# Patient Record
Sex: MALE | Race: OTHER | HISPANIC OR LATINO
[De-identification: names, ages, dates, MRNs, and addresses within clinical notes are randomized per-mention and may not be internally consistent; named-entity substitution may affect disease eponyms.]

---

## 2019-08-19 PROBLEM — Z00.00 ENCOUNTER FOR PREVENTIVE HEALTH EXAMINATION: Status: ACTIVE | Noted: 2019-08-19

## 2019-08-29 ENCOUNTER — APPOINTMENT (OUTPATIENT)
Dept: HEART AND VASCULAR | Facility: CLINIC | Age: 79
End: 2019-08-29
Payer: MEDICARE

## 2019-08-29 VITALS
SYSTOLIC BLOOD PRESSURE: 122 MMHG | BODY MASS INDEX: 28.16 KG/M2 | HEIGHT: 62 IN | RESPIRATION RATE: 12 BRPM | OXYGEN SATURATION: 98 % | DIASTOLIC BLOOD PRESSURE: 80 MMHG | WEIGHT: 153 LBS | HEART RATE: 76 BPM

## 2019-08-29 DIAGNOSIS — Z86.39 PERSONAL HISTORY OF OTHER ENDOCRINE, NUTRITIONAL AND METABOLIC DISEASE: ICD-10-CM

## 2019-08-29 DIAGNOSIS — Z86.2 PERSONAL HISTORY OF DISEASES OF THE BLOOD AND BLOOD-FORMING ORGANS AND CERTAIN DISORDERS INVOLVING THE IMMUNE MECHANISM: ICD-10-CM

## 2019-08-29 DIAGNOSIS — R06.02 SHORTNESS OF BREATH: ICD-10-CM

## 2019-08-29 DIAGNOSIS — Z87.19 PERSONAL HISTORY OF OTHER DISEASES OF THE DIGESTIVE SYSTEM: ICD-10-CM

## 2019-08-29 DIAGNOSIS — Z87.09 PERSONAL HISTORY OF OTHER DISEASES OF THE RESPIRATORY SYSTEM: ICD-10-CM

## 2019-08-29 DIAGNOSIS — Z87.891 PERSONAL HISTORY OF NICOTINE DEPENDENCE: ICD-10-CM

## 2019-08-29 DIAGNOSIS — Z82.49 FAMILY HISTORY OF ISCHEMIC HEART DISEASE AND OTHER DISEASES OF THE CIRCULATORY SYSTEM: ICD-10-CM

## 2019-08-29 PROCEDURE — 99204 OFFICE O/P NEW MOD 45 MIN: CPT

## 2019-08-29 PROCEDURE — 93306 TTE W/DOPPLER COMPLETE: CPT

## 2019-08-29 PROCEDURE — 93000 ELECTROCARDIOGRAM COMPLETE: CPT

## 2019-08-29 PROCEDURE — 93880 EXTRACRANIAL BILAT STUDY: CPT

## 2019-08-29 RX ORDER — FERROUS SULFATE TAB EC 325 MG (65 MG FE EQUIVALENT) 325 (65 FE) MG
325 (65 FE) TABLET DELAYED RESPONSE ORAL
Refills: 0 | Status: ACTIVE | COMMUNITY

## 2019-08-29 RX ORDER — FOLIC ACID 1 MG/1
1 TABLET ORAL
Refills: 0 | Status: ACTIVE | COMMUNITY

## 2019-08-29 RX ORDER — OMEPRAZOLE 20 MG/1
20 CAPSULE, DELAYED RELEASE ORAL
Refills: 0 | Status: ACTIVE | COMMUNITY

## 2019-08-29 RX ORDER — ACETAMINOPHEN/DIPHENHYDRAMINE 500MG-25MG
1000 TABLET ORAL
Refills: 0 | Status: ACTIVE | COMMUNITY

## 2019-08-29 RX ORDER — POTASSIUM CHLORIDE 750 MG/1
10 CAPSULE, EXTENDED RELEASE ORAL
Refills: 0 | Status: ACTIVE | COMMUNITY

## 2019-08-29 RX ORDER — APIXABAN 5 MG/1
5 TABLET, FILM COATED ORAL
Qty: 180 | Refills: 1 | Status: ACTIVE | COMMUNITY

## 2019-08-29 RX ORDER — METOPROLOL SUCCINATE 25 MG/1
25 TABLET, EXTENDED RELEASE ORAL
Refills: 0 | Status: ACTIVE | COMMUNITY

## 2019-08-29 RX ORDER — GEMFIBROZIL 600 MG/1
600 TABLET, FILM COATED ORAL TWICE DAILY
Qty: 60 | Refills: 3 | Status: ACTIVE | COMMUNITY

## 2019-08-29 NOTE — DISCUSSION/SUMMARY
[With Me] : with me [___ Month(s)] : [unfilled] month(s) [FreeTextEntry1] : 1. Shortness of breath: Multifactorial likely secondary to mild COPD and interstitial lung disease. Will obtain echocardiogram and an attempt to obtain the nuclear stress test report. Will refer to pulmonary medicine for further workup.\par 2. Hyperlipidemia: Continue gemfibrozil, will followup lipid panel and advise once results no\par 3. Hypertension: Continue amlodipine 10 mg daily maintain a low-salt diet\par 4. Atrial fibrillation: Continue eliquis 5 mg twice daily metoprolol 25 mg daily\par 5. COPD: Pulmonary evaluation\par 6. Carotid bruit: Carotid duplex\par 7. Vitamin D deficiency: Continue vitamin D supplementation\par A. GERD: Continue PPI GI evaluation

## 2019-08-29 NOTE — PHYSICAL EXAM
[Normal Appearance] : normal appearance [General Appearance - Well Developed] : well developed [Well Groomed] : well groomed [No Deformities] : no deformities [General Appearance - Well Nourished] : well nourished [General Appearance - In No Acute Distress] : no acute distress [No Oral Pallor] : no oral pallor [Normal Oral Mucosa] : normal oral mucosa [Normal Jugular Venous A Waves Present] : normal jugular venous A waves present [No Oral Cyanosis] : no oral cyanosis [Normal Jugular Venous V Waves Present] : normal jugular venous V waves present [No Jugular Venous Garza A Waves] : no jugular venous garza A waves [5th Left ICS - MCL] : palpated at the 5th LICS in the midclavicular line [Normal Rate] : normal [Normal] : normal [Irregularly Irregular] : irregularly irregular [Normal S1] : normal S1 [III] : a grade 3 [Normal S2] : normal S2 [Left Carotid Bruit] : left carotid bruit heard [No Pitting Edema] : no pitting edema present [Right Carotid Bruit] : right carotid bruit heard [Exaggerated Use Of Accessory Muscles For Inspiration] : no accessory muscle use [Respiration, Rhythm And Depth] : normal respiratory rhythm and effort [Auscultation Breath Sounds / Voice Sounds] : lungs were clear to auscultation bilaterally [Abdomen Soft] : soft [Abdomen Mass (___ Cm)] : no abdominal mass palpated [Abdomen Tenderness] : non-tender [Abnormal Walk] : normal gait [Cyanosis, Localized] : no localized cyanosis [Nail Clubbing] : no clubbing of the fingernails [Gait - Sufficient For Exercise Testing] : the gait was sufficient for exercise testing [] : no ischemic changes [Petechial Hemorrhages (___cm)] : no petechial hemorrhages [Affect] : the affect was normal [Oriented To Time, Place, And Person] : oriented to person, place, and time [Mood] : the mood was normal [No Anxiety] : not feeling anxious

## 2019-08-29 NOTE — HISTORY OF PRESENT ILLNESS
[FreeTextEntry1] : 78-year-old male with a past medical history of hypertension hyperlipidemia iron deficiency vitamin D deficiency atrial fibrillation on eliquis comes in for evaluation of shortness of breath. Patient reports a few months of exertional shortness of breath most notably after 4-6 blocks of walking which is been progressively worsening. Denies any chest pain. Patient's had a nuclear stress test done one year ago reportedly within normal limits report not available for review at this time. Patient recently underwent a CT scan with contrast to rule out pulmonary embolism will do note and found to have mild centrilobular emphysematous changes in the upper lobe as well as interstitial lung disease. Currently chest pain-free.

## 2019-08-30 LAB
ALBUMIN SERPL ELPH-MCNC: 5 G/DL
ALP BLD-CCNC: 129 U/L
ALT SERPL-CCNC: 21 U/L
ANION GAP SERPL CALC-SCNC: 14 MMOL/L
AST SERPL-CCNC: 25 U/L
BASOPHILS # BLD AUTO: 0.04 K/UL
BASOPHILS NFR BLD AUTO: 0.5 %
BILIRUB SERPL-MCNC: 0.5 MG/DL
BUN SERPL-MCNC: 20 MG/DL
CALCIUM SERPL-MCNC: 10.1 MG/DL
CHLORIDE SERPL-SCNC: 105 MMOL/L
CHOLEST SERPL-MCNC: 172 MG/DL
CHOLEST/HDLC SERPL: 4.2 RATIO
CO2 SERPL-SCNC: 23 MMOL/L
CREAT SERPL-MCNC: 1.23 MG/DL
EOSINOPHIL # BLD AUTO: 0.18 K/UL
EOSINOPHIL NFR BLD AUTO: 2.3 %
ESTIMATED AVERAGE GLUCOSE: 100 MG/DL
FOLATE SERPL-MCNC: >20 NG/ML
GLUCOSE SERPL-MCNC: 98 MG/DL
HBA1C MFR BLD HPLC: 5.1 %
HCT VFR BLD CALC: 47.9 %
HDLC SERPL-MCNC: 41 MG/DL
HGB BLD-MCNC: 15.5 G/DL
IMM GRANULOCYTES NFR BLD AUTO: 0.6 %
LDLC SERPL CALC-MCNC: 106 MG/DL
LYMPHOCYTES # BLD AUTO: 1.49 K/UL
LYMPHOCYTES NFR BLD AUTO: 19.2 %
MAGNESIUM SERPL-MCNC: 2.1 MG/DL
MAN DIFF?: NORMAL
MCHC RBC-ENTMCNC: 32.4 GM/DL
MCHC RBC-ENTMCNC: 33.3 PG
MCV RBC AUTO: 102.8 FL
MONOCYTES # BLD AUTO: 0.56 K/UL
MONOCYTES NFR BLD AUTO: 7.2 %
NEUTROPHILS # BLD AUTO: 5.46 K/UL
NEUTROPHILS NFR BLD AUTO: 70.2 %
NT-PROBNP SERPL-MCNC: 246 PG/ML
PHOSPHATE SERPL-MCNC: 3.5 MG/DL
PLATELET # BLD AUTO: 342 K/UL
POTASSIUM SERPL-SCNC: 5.3 MMOL/L
PROT SERPL-MCNC: 7.3 G/DL
PSA SERPL-MCNC: 1.83 NG/ML
RBC # BLD: 4.66 M/UL
RBC # FLD: 13.2 %
SODIUM SERPL-SCNC: 142 MMOL/L
T3FREE SERPL-MCNC: 2.73 PG/ML
T4 FREE SERPL-MCNC: 1.1 NG/DL
T4 SERPL-MCNC: 5.6 UG/DL
TRIGL SERPL-MCNC: 123 MG/DL
TSH SERPL-ACNC: 1.48 UIU/ML
VIT B12 SERPL-MCNC: 619 PG/ML
WBC # FLD AUTO: 7.78 K/UL

## 2019-09-03 LAB — 25(OH)D3 SERPL-MCNC: 27.9 NG/ML

## 2020-01-21 ENCOUNTER — APPOINTMENT (OUTPATIENT)
Dept: HEART AND VASCULAR | Facility: CLINIC | Age: 80
End: 2020-01-21
Payer: MEDICARE

## 2020-01-21 VITALS
HEIGHT: 62 IN | WEIGHT: 152 LBS | DIASTOLIC BLOOD PRESSURE: 80 MMHG | RESPIRATION RATE: 12 BRPM | HEART RATE: 70 BPM | SYSTOLIC BLOOD PRESSURE: 120 MMHG | BODY MASS INDEX: 27.97 KG/M2

## 2020-01-21 PROCEDURE — 93000 ELECTROCARDIOGRAM COMPLETE: CPT

## 2020-01-21 PROCEDURE — 99214 OFFICE O/P EST MOD 30 MIN: CPT

## 2020-01-21 NOTE — HISTORY OF PRESENT ILLNESS
[Preoperative Visit] : for a medical evaluation prior to surgery [Scheduled Procedure ___] : a [unfilled] [Metabolic Capacity ___Mets%] : The patient has a metabolic capacity of [unfilled] Mets%  [Good] : Good [Chest Pain] : no chest pain [Dyspnea] : no dyspnea [Lower Extremity Swelling] : no lower extremity swelling [FreeTextEntry1] : 79-year-old male with a past medical history of COPD hypertension hyperlipidemia atrial fibrillation on eliquis comes in for preop clearance for dental work. [facility] seen in [facility] in [facility] for chest pain. Workup was negative determined to be GERD and patient started on PPI. Patient denies any exertional symptoms of chest pain or shortness of breath PND or orthopnea.

## 2020-01-21 NOTE — DISCUSSION/SUMMARY
[Patient Intermediate Risk] : the patient is an intermediate risk [Procedure Low Risk] : the procedure risk is low [Optimized for Surgery] : the patient is optimized for surgery [FreeTextEntry1] : 1. Preop clearance for dental work: Patient is low-intermediate risk for low risk procedure may proceed as planned advised to hold eliquis for 3 doses prior to planned procedure. Instructions given to daughter via telephone as patient and his spouse primarily speak Chinese however due understanding which fairly well.\par 2. COPD: Follow up pulmonary\par 3. Hypertension: Continue amlodipine and Toprol maintain a low-salt diet\par 4. Atrial fibrillation: Restart eliquis as soon as cleared by dentistry.\par 5. Hyperlipidemia: Continue gemfibrozil

## 2020-01-21 NOTE — PHYSICAL EXAM
[General Appearance - Well Developed] : well developed [Normal Appearance] : normal appearance [Well Groomed] : well groomed [General Appearance - Well Nourished] : well nourished [No Deformities] : no deformities [General Appearance - In No Acute Distress] : no acute distress [Normal Oral Mucosa] : normal oral mucosa [No Oral Pallor] : no oral pallor [Normal Jugular Venous A Waves Present] : normal jugular venous A waves present [No Oral Cyanosis] : no oral cyanosis [Normal Jugular Venous V Waves Present] : normal jugular venous V waves present [No Jugular Venous Garza A Waves] : no jugular venous garza A waves [Respiration, Rhythm And Depth] : normal respiratory rhythm and effort [Exaggerated Use Of Accessory Muscles For Inspiration] : no accessory muscle use [Auscultation Breath Sounds / Voice Sounds] : lungs were clear to auscultation bilaterally [Heart Rate And Rhythm] : heart rate and rhythm were normal [Arterial Pulses Normal] : the arterial pulses were normal [Edema] : no peripheral edema present [Abdomen Tenderness] : non-tender [Abdomen Soft] : soft [Abdomen Mass (___ Cm)] : no abdominal mass palpated [Abnormal Walk] : normal gait [Gait - Sufficient For Exercise Testing] : the gait was sufficient for exercise testing [Cyanosis, Localized] : no localized cyanosis [Nail Clubbing] : no clubbing of the fingernails [Petechial Hemorrhages (___cm)] : no petechial hemorrhages [] : no ischemic changes [Oriented To Time, Place, And Person] : oriented to person, place, and time [Mood] : the mood was normal [Affect] : the affect was normal [No Anxiety] : not feeling anxious [FreeTextEntry1] : irreg irreg

## 2020-03-05 ENCOUNTER — NON-APPOINTMENT (OUTPATIENT)
Age: 80
End: 2020-03-05

## 2020-03-05 ENCOUNTER — APPOINTMENT (OUTPATIENT)
Dept: HEART AND VASCULAR | Facility: CLINIC | Age: 80
End: 2020-03-05
Payer: MEDICARE

## 2020-03-05 VITALS
HEIGHT: 62 IN | HEART RATE: 76 BPM | SYSTOLIC BLOOD PRESSURE: 102 MMHG | DIASTOLIC BLOOD PRESSURE: 78 MMHG | WEIGHT: 152 LBS | RESPIRATION RATE: 12 BRPM | BODY MASS INDEX: 27.97 KG/M2

## 2020-03-05 PROCEDURE — 99214 OFFICE O/P EST MOD 30 MIN: CPT

## 2020-03-05 PROCEDURE — 93000 ELECTROCARDIOGRAM COMPLETE: CPT

## 2020-03-05 NOTE — PHYSICAL EXAM
[General Appearance - Well Developed] : well developed [Normal Appearance] : normal appearance [Well Groomed] : well groomed [General Appearance - Well Nourished] : well nourished [No Deformities] : no deformities [General Appearance - In No Acute Distress] : no acute distress [Normal Oral Mucosa] : normal oral mucosa [No Oral Pallor] : no oral pallor [No Oral Cyanosis] : no oral cyanosis [Normal Jugular Venous A Waves Present] : normal jugular venous A waves present [Normal Jugular Venous V Waves Present] : normal jugular venous V waves present [No Jugular Venous Garza A Waves] : no jugular venous garza A waves [Respiration, Rhythm And Depth] : normal respiratory rhythm and effort [Exaggerated Use Of Accessory Muscles For Inspiration] : no accessory muscle use [Auscultation Breath Sounds / Voice Sounds] : lungs were clear to auscultation bilaterally [Heart Rate And Rhythm] : heart rate and rhythm were normal [Arterial Pulses Normal] : the arterial pulses were normal [Edema] : no peripheral edema present [FreeTextEntry1] : irreg irreg [Abdomen Soft] : soft [Abdomen Tenderness] : non-tender [Abdomen Mass (___ Cm)] : no abdominal mass palpated [Abnormal Walk] : normal gait [Gait - Sufficient For Exercise Testing] : the gait was sufficient for exercise testing [Nail Clubbing] : no clubbing of the fingernails [Cyanosis, Localized] : no localized cyanosis [Petechial Hemorrhages (___cm)] : no petechial hemorrhages [] : no ischemic changes [Oriented To Time, Place, And Person] : oriented to person, place, and time [Affect] : the affect was normal [Mood] : the mood was normal [No Anxiety] : not feeling anxious

## 2020-03-05 NOTE — HISTORY OF PRESENT ILLNESS
[FreeTextEntry1] : 79-year-old male with a past medical history hypertension hyperlipidemia COPD comes in for evaluation of chest pain intermittent claudication. Patient reports one week ago while at the dentist chair being fitted for dentures suddenly developed left-sided nonradiating sharp chest discomfort. This was referred to the emergency room ruled out for an MI and discharged. Patient also underwent an ischemic evaluation less than one year ago reportedly within normal limits. Patient denies any exertional chest pain or shortness of breath. However, he does complain of bilateral calf pain when walking.

## 2020-03-05 NOTE — DISCUSSION/SUMMARY
[FreeTextEntry1] : 1. Chest pain: Atypical; advised to get copy of recent ischemic evaluation for review. In the absence of ongoing exertional symptoms, will obtain an EKG. If symptoms recur unless to call or return for further workup.\par 2. Intermittent claudication: PVR will advise once results known.\par 3. A. fib: Continue eliquis and Toprol.\par 4. Hyperlipidemia: Continue gemfibrozil\par 5. Hypertension: Continue amlodipine\par 6. COPD: Continue to followup with PCP and pulmonary

## 2020-03-10 ENCOUNTER — APPOINTMENT (OUTPATIENT)
Dept: HEART AND VASCULAR | Facility: CLINIC | Age: 80
End: 2020-03-10

## 2020-03-10 ENCOUNTER — APPOINTMENT (OUTPATIENT)
Dept: HEART AND VASCULAR | Facility: CLINIC | Age: 80
End: 2020-03-10
Payer: MEDICARE

## 2020-03-10 PROCEDURE — 93923 UPR/LXTR ART STDY 3+ LVLS: CPT

## 2020-06-18 ENCOUNTER — NON-APPOINTMENT (OUTPATIENT)
Age: 80
End: 2020-06-18

## 2020-06-18 ENCOUNTER — APPOINTMENT (OUTPATIENT)
Dept: HEART AND VASCULAR | Facility: CLINIC | Age: 80
End: 2020-06-18
Payer: MEDICARE

## 2020-06-18 VITALS
HEIGHT: 62 IN | DIASTOLIC BLOOD PRESSURE: 80 MMHG | BODY MASS INDEX: 28.34 KG/M2 | HEART RATE: 70 BPM | WEIGHT: 154 LBS | RESPIRATION RATE: 12 BRPM | SYSTOLIC BLOOD PRESSURE: 122 MMHG

## 2020-06-18 PROCEDURE — 93000 ELECTROCARDIOGRAM COMPLETE: CPT

## 2020-06-18 PROCEDURE — 99214 OFFICE O/P EST MOD 30 MIN: CPT

## 2020-06-18 NOTE — DISCUSSION/SUMMARY
[FreeTextEntry1] : 1. Hypertension: Continue metoprolol and amlodipine. Maintain a low-salt diet\par 2. Atrial fibrillation: Continue eliquis\par 3. Hyperlipidemia: Continue gemfibrozil\par 4. COPD: Continue bronchodilators and follow up with PCP in pulmonary.

## 2020-06-18 NOTE — HISTORY OF PRESENT ILLNESS
[FreeTextEntry1] : 79-year-old male with past medical history of hypertension hyperlipidemia COPD comes in for routine followup. Patient denies any chest pain shortness of breath PND or orthopnea. Patient reports intermittent claudication has resolved.

## 2020-06-18 NOTE — PHYSICAL EXAM
[Normal Appearance] : normal appearance [General Appearance - Well Developed] : well developed [General Appearance - Well Nourished] : well nourished [Well Groomed] : well groomed [No Deformities] : no deformities [General Appearance - In No Acute Distress] : no acute distress [Normal Oral Mucosa] : normal oral mucosa [No Oral Pallor] : no oral pallor [No Oral Cyanosis] : no oral cyanosis [Normal Jugular Venous V Waves Present] : normal jugular venous V waves present [Normal Jugular Venous A Waves Present] : normal jugular venous A waves present [No Jugular Venous Garza A Waves] : no jugular venous garza A waves [Respiration, Rhythm And Depth] : normal respiratory rhythm and effort [Auscultation Breath Sounds / Voice Sounds] : lungs were clear to auscultation bilaterally [Exaggerated Use Of Accessory Muscles For Inspiration] : no accessory muscle use [Heart Rate And Rhythm] : heart rate and rhythm were normal [Arterial Pulses Normal] : the arterial pulses were normal [Edema] : no peripheral edema present [Abdomen Soft] : soft [FreeTextEntry1] : irreg irreg [Abdomen Mass (___ Cm)] : no abdominal mass palpated [Abdomen Tenderness] : non-tender [Nail Clubbing] : no clubbing of the fingernails [Gait - Sufficient For Exercise Testing] : the gait was sufficient for exercise testing [Abnormal Walk] : normal gait [Petechial Hemorrhages (___cm)] : no petechial hemorrhages [Cyanosis, Localized] : no localized cyanosis [] : no ischemic changes [Oriented To Time, Place, And Person] : oriented to person, place, and time [Affect] : the affect was normal [Mood] : the mood was normal [No Anxiety] : not feeling anxious

## 2020-07-06 ENCOUNTER — APPOINTMENT (OUTPATIENT)
Dept: HEART AND VASCULAR | Facility: CLINIC | Age: 80
End: 2020-07-06

## 2020-11-11 ENCOUNTER — APPOINTMENT (OUTPATIENT)
Dept: HEART AND VASCULAR | Facility: CLINIC | Age: 80
End: 2020-11-11
Payer: MEDICARE

## 2020-11-11 ENCOUNTER — NON-APPOINTMENT (OUTPATIENT)
Age: 80
End: 2020-11-11

## 2020-11-11 VITALS
HEIGHT: 62 IN | DIASTOLIC BLOOD PRESSURE: 78 MMHG | SYSTOLIC BLOOD PRESSURE: 122 MMHG | BODY MASS INDEX: 27.6 KG/M2 | HEART RATE: 68 BPM | WEIGHT: 150 LBS | RESPIRATION RATE: 12 BRPM

## 2020-11-11 DIAGNOSIS — Z01.810 ENCOUNTER FOR PREPROCEDURAL CARDIOVASCULAR EXAMINATION: ICD-10-CM

## 2020-11-11 PROCEDURE — 93306 TTE W/DOPPLER COMPLETE: CPT

## 2020-11-11 PROCEDURE — 99214 OFFICE O/P EST MOD 30 MIN: CPT | Mod: 25

## 2020-11-16 NOTE — HISTORY OF PRESENT ILLNESS
[Preoperative Visit] : for a medical evaluation prior to surgery [Scheduled Procedure ___] : a [unfilled] [Good] : Good [Chest Pain] : no chest pain [Dyspnea] : no dyspnea [Lower Extremity Swelling] : no lower extremity swelling [FreeTextEntry1] : 80-year-old male with a past medical history of hypertension hyperlipidemia COPD paroxysmal A. fib comes in for preop clearance for planned inguinal hernia repair. Overall, patient feels well denies chest pain shortness of breath PND or orthopnea does greater than 4 METS of daily activity without symptoms. Patient underwent an ischemic evaluation 2018 which revealed apical lateral small reversible defect medically managed has not had any more chest pain since. Study done by outside MDs office.

## 2020-11-16 NOTE — DISCUSSION/SUMMARY
[Procedure Low Risk] : the procedure risk is low [Patient Intermediate Risk] : the patient is an intermediate risk [Optimized for Surgery] : the patient is optimized for surgery [As per surgery] : as per surgery [Continue] : Continue medications as currently directed [FreeTextEntry1] : 1. Preop clearance: Patient is moderate risk for low-intermediate risk surgery. Does greater than 4 METS of activity without chest pain shortness of breath PND orthopnea. May proceed with planned procedure. Advised to hold eliquis for 3 doses prior to surgery. Continue perioperative beta blocker.\par 2. Atrial fibrillation: Up until 2 days prior to surgery continue eliquis. EKG.\par 3. Hypertension: Well controlled on Toprol and amlodipine advised continue meds and low salt diet\par 4. Hyperlipidemia: Continue gemfibrozil\par 5. Diastolic dysfunction: Repeat echocardiogram to assess for progression of disease.

## 2020-11-16 NOTE — PHYSICAL EXAM
[General Appearance - Well Developed] : well developed [Normal Appearance] : normal appearance [Well Groomed] : well groomed [General Appearance - Well Nourished] : well nourished [No Deformities] : no deformities [General Appearance - In No Acute Distress] : no acute distress [Normal Oral Mucosa] : normal oral mucosa [No Oral Pallor] : no oral pallor [No Oral Cyanosis] : no oral cyanosis [Normal Jugular Venous A Waves Present] : normal jugular venous A waves present [Normal Jugular Venous V Waves Present] : normal jugular venous V waves present [No Jugular Venous Garza A Waves] : no jugular venous garza A waves [Respiration, Rhythm And Depth] : normal respiratory rhythm and effort [Exaggerated Use Of Accessory Muscles For Inspiration] : no accessory muscle use [Auscultation Breath Sounds / Voice Sounds] : lungs were clear to auscultation bilaterally [Heart Rate And Rhythm] : heart rate and rhythm were normal [Arterial Pulses Normal] : the arterial pulses were normal [Edema] : no peripheral edema present [Abdomen Soft] : soft [Abdomen Tenderness] : non-tender [Abdomen Mass (___ Cm)] : no abdominal mass palpated [Abnormal Walk] : normal gait [Gait - Sufficient For Exercise Testing] : the gait was sufficient for exercise testing [Nail Clubbing] : no clubbing of the fingernails [Cyanosis, Localized] : no localized cyanosis [Petechial Hemorrhages (___cm)] : no petechial hemorrhages [] : no ischemic changes [Oriented To Time, Place, And Person] : oriented to person, place, and time [Affect] : the affect was normal [Mood] : the mood was normal [No Anxiety] : not feeling anxious [FreeTextEntry1] : irreg irreg III/VI SYD

## 2020-11-17 LAB
ALBUMIN SERPL ELPH-MCNC: 4.9 G/DL
ALP BLD-CCNC: 147 U/L
ALT SERPL-CCNC: 24 U/L
ANION GAP SERPL CALC-SCNC: 16 MMOL/L
APTT BLD: 36.2 SEC
AST SERPL-CCNC: 26 U/L
BASOPHILS # BLD AUTO: 0.04 K/UL
BASOPHILS NFR BLD AUTO: 0.6 %
BILIRUB SERPL-MCNC: 0.4 MG/DL
BUN SERPL-MCNC: 17 MG/DL
CALCIUM SERPL-MCNC: 9.9 MG/DL
CHLORIDE SERPL-SCNC: 102 MMOL/L
CO2 SERPL-SCNC: 23 MMOL/L
CREAT SERPL-MCNC: 1.22 MG/DL
EOSINOPHIL # BLD AUTO: 0.26 K/UL
EOSINOPHIL NFR BLD AUTO: 4.2 %
GLUCOSE SERPL-MCNC: 102 MG/DL
HCT VFR BLD CALC: 49.5 %
HGB BLD-MCNC: 15.9 G/DL
IMM GRANULOCYTES NFR BLD AUTO: 0.5 %
INR PPP: 1.02 RATIO
LYMPHOCYTES # BLD AUTO: 1.59 K/UL
LYMPHOCYTES NFR BLD AUTO: 25.6 %
MAN DIFF?: NORMAL
MCHC RBC-ENTMCNC: 31.8 PG
MCHC RBC-ENTMCNC: 32.1 GM/DL
MCV RBC AUTO: 99 FL
MONOCYTES # BLD AUTO: 0.56 K/UL
MONOCYTES NFR BLD AUTO: 9 %
NEUTROPHILS # BLD AUTO: 3.73 K/UL
NEUTROPHILS NFR BLD AUTO: 60.1 %
PLATELET # BLD AUTO: 294 K/UL
POTASSIUM SERPL-SCNC: 4.7 MMOL/L
PROT SERPL-MCNC: 7.2 G/DL
PT BLD: 12 SEC
RBC # BLD: 5 M/UL
RBC # FLD: 13.2 %
SODIUM SERPL-SCNC: 142 MMOL/L
WBC # FLD AUTO: 6.21 K/UL

## 2021-01-04 ENCOUNTER — APPOINTMENT (OUTPATIENT)
Dept: HEART AND VASCULAR | Facility: CLINIC | Age: 81
End: 2021-01-04

## 2021-03-11 ENCOUNTER — APPOINTMENT (OUTPATIENT)
Dept: HEART AND VASCULAR | Facility: CLINIC | Age: 81
End: 2021-03-11
Payer: MEDICARE

## 2021-03-11 ENCOUNTER — NON-APPOINTMENT (OUTPATIENT)
Age: 81
End: 2021-03-11

## 2021-03-11 VITALS
WEIGHT: 152 LBS | BODY MASS INDEX: 27.97 KG/M2 | RESPIRATION RATE: 12 BRPM | DIASTOLIC BLOOD PRESSURE: 70 MMHG | HEIGHT: 62 IN | SYSTOLIC BLOOD PRESSURE: 122 MMHG | HEART RATE: 70 BPM

## 2021-03-11 DIAGNOSIS — Z87.09 PERSONAL HISTORY OF OTHER DISEASES OF THE RESPIRATORY SYSTEM: ICD-10-CM

## 2021-03-11 PROCEDURE — 99072 ADDL SUPL MATRL&STAF TM PHE: CPT

## 2021-03-11 PROCEDURE — 93000 ELECTROCARDIOGRAM COMPLETE: CPT

## 2021-03-11 PROCEDURE — 99214 OFFICE O/P EST MOD 30 MIN: CPT

## 2021-03-11 NOTE — DISCUSSION/SUMMARY
[FreeTextEntry1] : 1. Intermittent claudication: Will refer for bilateral lower extremity arterial duplex and advise once results are known.\par 2. Atrial fibrillation: Continue rate control as well as eliquis\par 3. COPD: Continue bronchodilators and pulmonary followup\par 4. Chronic diastolic dysfunction: Continue Toprol. EKG.\par 5.Hyperlipidemia: Continue. Gemfibrozil\par 6. Hypertension: Continue amlodipine

## 2021-03-11 NOTE — HISTORY OF PRESENT ILLNESS
[FreeTextEntry1] : 80-year-old male with a past medical history of hypertension hyperlipidemia atrial fibrillation on anticoagulation intermittent claudication comes in for routine followup. Other than worsening intermittent claudication patient feels well denies chest pain shortness of breath PND or orthopnea.

## 2021-04-09 ENCOUNTER — TRANSCRIPTION ENCOUNTER (OUTPATIENT)
Age: 81
End: 2021-04-09

## 2021-04-09 ENCOUNTER — APPOINTMENT (OUTPATIENT)
Dept: HEART AND VASCULAR | Facility: CLINIC | Age: 81
End: 2021-04-09
Payer: MEDICARE

## 2021-04-09 VITALS
SYSTOLIC BLOOD PRESSURE: 145 MMHG | DIASTOLIC BLOOD PRESSURE: 90 MMHG | BODY MASS INDEX: 28.52 KG/M2 | RESPIRATION RATE: 12 BRPM | WEIGHT: 155 LBS | HEIGHT: 62 IN

## 2021-04-09 VITALS — SYSTOLIC BLOOD PRESSURE: 140 MMHG | DIASTOLIC BLOOD PRESSURE: 90 MMHG

## 2021-04-09 PROCEDURE — 99072 ADDL SUPL MATRL&STAF TM PHE: CPT

## 2021-04-09 PROCEDURE — 99214 OFFICE O/P EST MOD 30 MIN: CPT

## 2021-04-09 PROCEDURE — 93925 LOWER EXTREMITY STUDY: CPT

## 2021-04-09 RX ORDER — AMLODIPINE BESYLATE 10 MG/1
10 TABLET ORAL
Refills: 0 | Status: ACTIVE | COMMUNITY

## 2021-04-09 NOTE — PHYSICAL EXAM
[General Appearance - Well Developed] : well developed [Normal Appearance] : normal appearance [Well Groomed] : well groomed [General Appearance - Well Nourished] : well nourished [No Deformities] : no deformities [General Appearance - In No Acute Distress] : no acute distress [Normal Conjunctiva] : the conjunctiva exhibited no abnormalities [Eyelids - No Xanthelasma] : the eyelids demonstrated no xanthelasmas [Normal Oral Mucosa] : normal oral mucosa [No Oral Pallor] : no oral pallor [No Oral Cyanosis] : no oral cyanosis [Normal Jugular Venous A Waves Present] : normal jugular venous A waves present [Normal Jugular Venous V Waves Present] : normal jugular venous V waves present [No Jugular Venous Garza A Waves] : no jugular venous garza A waves [Heart Rate And Rhythm] : heart rate and rhythm were normal [Heart Sounds] : normal S1 and S2 [Murmurs] : no murmurs present [Respiration, Rhythm And Depth] : normal respiratory rhythm and effort [Exaggerated Use Of Accessory Muscles For Inspiration] : no accessory muscle use [Auscultation Breath Sounds / Voice Sounds] : lungs were clear to auscultation bilaterally [Abdomen Soft] : soft [Abdomen Tenderness] : non-tender [Abdomen Mass (___ Cm)] : no abdominal mass palpated [Nail Clubbing] : no clubbing of the fingernails [Cyanosis, Localized] : no localized cyanosis [Petechial Hemorrhages (___cm)] : no petechial hemorrhages [Skin Color & Pigmentation] : normal skin color and pigmentation [] : no rash [No Venous Stasis] : no venous stasis [Skin Lesions] : no skin lesions [No Skin Ulcers] : no skin ulcer [No Xanthoma] : no  xanthoma was observed [Oriented To Time, Place, And Person] : oriented to person, place, and time [Affect] : the affect was normal [Mood] : the mood was normal [No Anxiety] : not feeling anxious

## 2021-04-09 NOTE — ASSESSMENT
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

## 2021-04-09 NOTE — HISTORY OF PRESENT ILLNESS
[FreeTextEntry1] : 80-year-old male with a past medical history of hypertension, hyperlipidemia, atrial fibrillation on Eliquis, who presents with intermittent claudication with severe cramping and pain in his legs (left >right) with <1-2 blocks. He occasionally also has pain at night (L>R). He has pain in his hip and thigh. He denies any ulceration in his feet or pain at rest.

## 2021-04-28 ENCOUNTER — RESULT REVIEW (OUTPATIENT)
Age: 81
End: 2021-04-28

## 2021-04-28 ENCOUNTER — OUTPATIENT (OUTPATIENT)
Dept: OUTPATIENT SERVICES | Facility: HOSPITAL | Age: 81
LOS: 1 days | End: 2021-04-28

## 2021-04-28 ENCOUNTER — APPOINTMENT (OUTPATIENT)
Dept: CT IMAGING | Facility: CLINIC | Age: 81
End: 2021-04-28
Payer: MEDICARE

## 2021-04-28 PROCEDURE — 74174 CTA ABD&PLVS W/CONTRAST: CPT | Mod: 26

## 2021-05-01 ENCOUNTER — OUTPATIENT (OUTPATIENT)
Dept: OUTPATIENT SERVICES | Facility: HOSPITAL | Age: 81
LOS: 1 days | End: 2021-05-01
Payer: MEDICARE

## 2021-05-24 VITALS
DIASTOLIC BLOOD PRESSURE: 84 MMHG | SYSTOLIC BLOOD PRESSURE: 134 MMHG | WEIGHT: 160.06 LBS | OXYGEN SATURATION: 98 % | TEMPERATURE: 97 F | HEIGHT: 65 IN | HEART RATE: 87 BPM | RESPIRATION RATE: 16 BRPM

## 2021-05-24 RX ORDER — CHLORHEXIDINE GLUCONATE 213 G/1000ML
1 SOLUTION TOPICAL ONCE
Refills: 0 | Status: DISCONTINUED | OUTPATIENT
Start: 2021-05-26 | End: 2021-05-27

## 2021-05-24 NOTE — H&P ADULT - NSHPSOCIALHISTORY_GEN_ALL_CORE
Patient reports social ETOH consumption. Patient reports being a former smoker, quit about 20 years ago. Patient denies any illicit drug use.

## 2021-05-24 NOTE — H&P ADULT - HISTORY OF PRESENT ILLNESS
COVID:   Cardiologist: Dr. Zambrano  Pharmacy:  Escort:  81 yo M w/ PMHx HTN, HLD, afib (on Eliquis last dose____), known carotid bruit, chronic HFpEF, COPD (?denies hospitalizations/intubation), GERD, BLANQUITA (baseline ____),  presented to outpatient cardiologist, Dr. Zambrano, c/o intermittent claudication w/ severe cramping in LE L>R upon 1-2 blocks ambulation, relieved w/ rest. Pt also admits to occasional pain at night. Pain is located in the hip and thighs. Denies any fevers, chills, sick contacts, recent travel, CP, SOB, palpitations, orthopnea, lightheadedness, n/v, LE edema, skin changes, ulcerations. 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 CTA Abd w/ runoff: R HIGNIIO measures 1.4 cm in maximum diameter and is patent. The R external and internal iliac arteries are patent. The L HIGINIO is aneurysmal distally measuring 1.7 cm in diameter. 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 There is no critical stenosis of the left external/internal iliac arteries.    In light of pt risk factors, Rutheford Grade II Category 3 symptoms, and positive CCTA, she is now referred to [facility] for cardiac catheterization with possible intervention if clinically indicated.    COVID: Received Moderna vaccine, last dose 04/26/2021, bringing proof of vaccination   Cardiologist: Dr. Zambrano  Pharmacy: [facility], [phone]  Escort: Son in law   CONFIRM MEDS ON ARRIVAL  81 y/o male w/ PMHx HTN, HLD, A-Fib (on Eliquis, last dose 05/24/2021 PM), known carotid bruit, chronic HFpEF, COPD (denies hospitalizations/intubation), GERD, and BLANQUITA (unknown baseline Hgb) who  presented to outpatient cardiologist, Dr. Zambrano, c/o intermittent claudication w/ severe cramping in lower extremities, left worse than right, upon ambulation of 1-2 blocks, and relieved w/ rest. Patient also admits to occasional pain at night. Patient states pain is located in the hip and thighs. Patient denies any fevers, chills, sick contacts, recent travel, chest pain, SOB, palpitations, orthopnea, lightheadedness, nauea/vomiting, LE edema, skin changes, and ulcerations. 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    In light of patient's risk factors, Rutheford Grade II Category 3 symptoms, and abnormal Arterial Duplex results and CTA Abdominal results, patient is  now referred to [facility] for peripheral angiogram w/ possible intervention if clinically indicated.    COVID: Received Moderna vaccine, last dose 04/26/2021, bringing proof of vaccination   Cardiologist: Dr. Zambrano  Pharmacy: [facility], [phone]  Escort: Son in law   79 y/o male w/ PMHx HTN, HLD, A-Fib (on Eliquis, last dose 05/24/2021 PM), known carotid bruit, chronic HFpEF, COPD (denies hospitalizations/intubation), GERD, and BLANQUITA (unknown baseline Hgb) who  presented to outpatient cardiologist, Dr. Zambrano, c/o intermittent claudication w/ severe cramping in lower extremities, left worse than right, upon ambulation of 1-2 blocks, and relieved w/ rest. Patient also admits to occasional pain at night. Patient states pain is located in the hip and thighs. Patient denies any fevers, chills, sick contacts, recent travel, chest pain, SOB, palpitations, orthopnea, lightheadedness, nauea/vomiting, LE edema, skin changes, and ulcerations. 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    In light of patient's risk factors, Rutheford Grade II Category 3 symptoms, and abnormal Arterial Duplex results and CTA Abdominal results, patient is  now referred to [facility] for peripheral angiogram w/ possible intervention if clinically indicated.    COVID: Received Moderna vaccine, last dose 04/26/2021  Cardiologist: Dr. Zambrano  Pharmacy: [facility], [phone]  Escort: Son in law   79 y/o male w/ PMHx HTN, HLD, A-Fib (on Eliquis, last dose 05/24/2021 PM), known carotid bruit, chronic HFpEF, COPD (denies hospitalizations/intubation), GERD, and BLANQUITA (unknown baseline Hgb) who  presented to outpatient cardiologist, Dr. Zambrano, c/o intermittent claudication w/ severe cramping in lower extremities, left worse than right, upon ambulation of 1-2 blocks, and relieved w/ rest. Patient also admits to occasional pain at night. Patient states pain is located in the hip and thighs. Patient denies any fevers, chills, sick contacts, recent travel, chest pain, SOB, palpitations, orthopnea, lightheadedness, nauea/vomiting, LE edema, skin changes, and ulcerations. 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    In light of patient's risk factors, Rutheford Grade II Category 3 symptoms, and abnormal Arterial Duplex results and CTA Abdominal results, patient is  now referred to [facility] for peripheral angiogram w/ possible intervention if clinically indicated.    COVID: Received Moderna vaccine, last dose 04/26/2021  Cardiologist: Dr. Zambrano  Pharmacy: [facility], [phone]  Escort: Son in law   79 y/o Tamaz[place] speaking male w/ PMHx HTN, HLD, A-Fib (on Eliquis, last dose 05/24/2021 PM), known carotid bruit, chronic HFpEF, COPD (denies hospitalizations/intubation), GERD, and BLANQUITA (unknown baseline Hgb) who  presented to outpatient cardiologist, Dr. Zambrano, c/o intermittent claudication w/ severe cramping in lower extremities, left worse than right, upon ambulation of 1-2 blocks, and relieved w/ rest. Patient also admits to occasional pain at night. Patient states pain is located in the hip and thighs. Patient denies any fevers, chills, sick contacts, recent travel, chest pain, SOB, palpitations, orthopnea, lightheadedness, nauea/vomiting, LE edema, skin changes, and ulcerations. 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    In light of patient's risk factors, Rutheford Grade II Category 3 symptoms, and abnormal Arterial Duplex results and CTA Abdominal results, patient is  now referred to [facility] for peripheral angiogram w/ possible intervention if clinically indicated.

## 2021-05-24 NOTE — H&P ADULT - ASSESSMENT
79 y/o male w/ PMHx HTN, HLD, A-Fib (on Eliquis, last dose 05/24/2021 PM), known carotid bruit, chronic HFpEF, COPD (denies hospitalizations/intubation), GERD, and BLANQUITA (unknown baseline Hgb) who in light of patient's risk factors, Rutheford Grade II Category 3 symptoms, and abnormal Arterial Duplex results and CTA Abdominal results, patient is  now referred to [facility] for peripheral angiogram w/ possible intervention if clinically indicated.    ASA II, Mallampati II    Hgb/HCT 13.1/40.5. Pt denies bleeding, melena, BRBPR, hematuria. Last dose Eliquis 5/24, pt was loaded with Aspirin 325mg PO x 1 and Plavix 600mg PO x 1.   NS @ 50 cc/hr, hx of HFpEF, unknown EF, pt is euvolemic on exam.     Pt was consented for procedure with  ID # [id number]  Risks & benefits of procedure and alternative therapy have been explained to the patient including but not limited to: allergic reaction, bleeding w/possible need for blood transfusion, infection, renal and vascular compromise, limb damage, arrhythmia, stroke, vessel dissection/perforation, Myocardial infarction, emergent CABG. Informed consent obtained and in chart.   79 y/o male w/ PMHx HTN, HLD, A-Fib (on Eliquis, last dose 05/24/2021 PM), known carotid bruit, chronic HFpEF, COPD (denies hospitalizations/intubation), GERD, and BLANQUITA (unknown baseline Hgb) who in light of patient's risk factors, Rutheford Grade II Category 3 symptoms, and abnormal Arterial Duplex results and CTA Abdominal results, patient is  now referred to [facility] for peripheral angiogram w/ possible intervention if clinically indicated.    ASA II, Mallampati II    Hgb/HCT 13.1/40.5. Pt denies bleeding, melena, BRBPR, hematuria. Last dose Eliquis 5/24, pt was loaded with Aspirin 325mg PO x 1 and Plavix 600mg PO x 1.   NS @ 50 cc/hr, hx of HFpEF, unknown EF, pt is euvolemic on exam. Cr 1.19.     Pt was consented for procedure with  ID # [id number]  Risks & benefits of procedure and alternative therapy have been explained to the patient including but not limited to: allergic reaction, bleeding w/possible need for blood transfusion, infection, renal and vascular compromise, limb damage, arrhythmia, stroke, vessel dissection/perforation, Myocardial infarction, emergent CABG. Informed consent obtained and in chart.   79 y/o Yoruba speaking male w/ PMHx HTN, HLD, A-Fib (on Eliquis, last dose 05/24/2021 PM), known carotid bruit, chronic HFpEF, COPD (denies hospitalizations/intubation), GERD, and BLANQUITA (unknown baseline Hgb) who in light of patient's risk factors, Rutheford Grade II Category 3 symptoms, and abnormal Arterial Duplex results and CTA Abdominal results, patient is  now referred to [facility] for peripheral angiogram w/ possible intervention if clinically indicated.    ASA II, Mallampati II    Hgb/HCT 13.1/40.5. Pt denies bleeding, melena, BRBPR, hematuria. Last dose Eliquis 5/24, pt was loaded with Aspirin 325mg PO x 1 and Plavix 600mg PO x 1.   NS @ 50 cc/hr, hx of HFpEF, unknown EF, pt is euvolemic on exam. Cr 1.19.   MISS ordered     Pt was consented for procedure with  ID # [id number]  Risks & benefits of procedure and alternative therapy have been explained to the patient including but not limited to: allergic reaction, bleeding w/possible need for blood transfusion, infection, renal and vascular compromise, limb damage, arrhythmia, stroke, vessel dissection/perforation, Myocardial infarction, emergent CABG. Informed consent obtained and in chart.

## 2021-05-24 NOTE — H&P ADULT - NSICDXPASTMEDICALHX_GEN_ALL_CORE_FT
PAST MEDICAL HISTORY:  Carotid bruit     Chronic atrial fibrillation     Chronic heart failure with preserved ejection fraction (HFpEF)     COPD (chronic obstructive pulmonary disease)     Diabetes     HLD (hyperlipidemia)     HTN (hypertension)     Iron deficiency anemia

## 2021-05-24 NOTE — H&P ADULT - NSHPLABSRESULTS_GEN_ALL_CORE
ECG: Afib @ 87bpm, no STT changes                         13.1   6.59  )-----------( 356      ( 26 May 2021 08:33 )             40.5               PT/INR - ( 26 May 2021 08:33 )   PT: 12.1 sec;   INR: 1.01          PTT - ( 26 May 2021 08:33 )  PTT:34.8 sec ECG: Afib @ 87bpm, no STT changes                         13.1   6.59  )-----------( 356      ( 26 May 2021 08:33 )             40.5       05-26    140  |  107  |  22  ----------------------------<  x   4.0   |  22  |  1.19    Ca    9.4      26 May 2021 08:33        PT/INR - ( 26 May 2021 08:33 )   PT: 12.1 sec;   INR: 1.01          PTT - ( 26 May 2021 08:33 )  PTT:34.8 sec

## 2021-05-26 ENCOUNTER — TRANSCRIPTION ENCOUNTER (OUTPATIENT)
Age: 81
End: 2021-05-26

## 2021-05-26 ENCOUNTER — INPATIENT (INPATIENT)
Facility: HOSPITAL | Age: 81
LOS: 0 days | Discharge: HOME | DRG: 271 | End: 2021-05-27
Attending: INTERNAL MEDICINE | Admitting: INTERNAL MEDICINE
Payer: MEDICARE

## 2021-05-26 DIAGNOSIS — E78.5 HYPERLIPIDEMIA, UNSPECIFIED: ICD-10-CM

## 2021-05-26 DIAGNOSIS — I70.213 ATHEROSCLEROSIS OF NATIVE ARTERIES OF EXTREMITIES WITH INTERMITTENT CLAUDICATION, BILATERAL LEGS: ICD-10-CM

## 2021-05-26 DIAGNOSIS — D50.9 IRON DEFICIENCY ANEMIA, UNSPECIFIED: ICD-10-CM

## 2021-05-26 DIAGNOSIS — I48.20 CHRONIC ATRIAL FIBRILLATION, UNSPECIFIED: ICD-10-CM

## 2021-05-26 DIAGNOSIS — Z90.49 ACQUIRED ABSENCE OF OTHER SPECIFIED PARTS OF DIGESTIVE TRACT: Chronic | ICD-10-CM

## 2021-05-26 DIAGNOSIS — E11.51 TYPE 2 DIABETES MELLITUS WITH DIABETIC PERIPHERAL ANGIOPATHY WITHOUT GANGRENE: ICD-10-CM

## 2021-05-26 DIAGNOSIS — I73.9 PERIPHERAL VASCULAR DISEASE, UNSPECIFIED: ICD-10-CM

## 2021-05-26 DIAGNOSIS — L97.819 NON-PRESSURE CHRONIC ULCER OF OTHER PART OF RIGHT LOWER LEG WITH UNSPECIFIED SEVERITY: ICD-10-CM

## 2021-05-26 DIAGNOSIS — I11.0 HYPERTENSIVE HEART DISEASE WITH HEART FAILURE: ICD-10-CM

## 2021-05-26 DIAGNOSIS — Z98.890 OTHER SPECIFIED POSTPROCEDURAL STATES: Chronic | ICD-10-CM

## 2021-05-26 DIAGNOSIS — I72.8 ANEURYSM OF OTHER SPECIFIED ARTERIES: ICD-10-CM

## 2021-05-26 DIAGNOSIS — Z79.899 OTHER LONG TERM (CURRENT) DRUG THERAPY: ICD-10-CM

## 2021-05-26 DIAGNOSIS — L97.829 NON-PRESSURE CHRONIC ULCER OF OTHER PART OF LEFT LOWER LEG WITH UNSPECIFIED SEVERITY: ICD-10-CM

## 2021-05-26 DIAGNOSIS — K21.9 GASTRO-ESOPHAGEAL REFLUX DISEASE WITHOUT ESOPHAGITIS: ICD-10-CM

## 2021-05-26 DIAGNOSIS — I24.9 ACUTE ISCHEMIC HEART DISEASE, UNSPECIFIED: ICD-10-CM

## 2021-05-26 DIAGNOSIS — I71.4 ABDOMINAL AORTIC ANEURYSM, WITHOUT RUPTURE: ICD-10-CM

## 2021-05-26 DIAGNOSIS — I50.32 CHRONIC DIASTOLIC (CONGESTIVE) HEART FAILURE: ICD-10-CM

## 2021-05-26 LAB
A1C WITH ESTIMATED AVERAGE GLUCOSE RESULT: 5.6 % — SIGNIFICANT CHANGE UP (ref 4–5.6)
ALBUMIN SERPL ELPH-MCNC: 4.6 G/DL — SIGNIFICANT CHANGE UP (ref 3.3–5)
ALP SERPL-CCNC: 150 U/L — HIGH (ref 40–120)
ALT FLD-CCNC: 11 U/L — SIGNIFICANT CHANGE UP (ref 10–45)
ANION GAP SERPL CALC-SCNC: 11 MMOL/L — SIGNIFICANT CHANGE UP (ref 5–17)
APTT BLD: 34.8 SEC — SIGNIFICANT CHANGE UP (ref 27.5–35.5)
AST SERPL-CCNC: 17 U/L — SIGNIFICANT CHANGE UP (ref 10–40)
BASOPHILS # BLD AUTO: 0.03 K/UL — SIGNIFICANT CHANGE UP (ref 0–0.2)
BASOPHILS NFR BLD AUTO: 0.5 % — SIGNIFICANT CHANGE UP (ref 0–2)
BILIRUB DIRECT SERPL-MCNC: 0.2 MG/DL — SIGNIFICANT CHANGE UP (ref 0–0.2)
BILIRUB INDIRECT FLD-MCNC: 0.1 MG/DL — LOW (ref 0.2–1)
BILIRUB SERPL-MCNC: 0.3 MG/DL — SIGNIFICANT CHANGE UP (ref 0.2–1.2)
BUN SERPL-MCNC: 22 MG/DL — SIGNIFICANT CHANGE UP (ref 7–23)
CALCIUM SERPL-MCNC: 9.4 MG/DL — SIGNIFICANT CHANGE UP (ref 8.4–10.5)
CHLORIDE SERPL-SCNC: 107 MMOL/L — SIGNIFICANT CHANGE UP (ref 96–108)
CHOLEST SERPL-MCNC: 162 MG/DL — SIGNIFICANT CHANGE UP
CO2 SERPL-SCNC: 22 MMOL/L — SIGNIFICANT CHANGE UP (ref 22–31)
CREAT SERPL-MCNC: 1.19 MG/DL — SIGNIFICANT CHANGE UP (ref 0.5–1.3)
EOSINOPHIL # BLD AUTO: 0.3 K/UL — SIGNIFICANT CHANGE UP (ref 0–0.5)
EOSINOPHIL NFR BLD AUTO: 4.6 % — SIGNIFICANT CHANGE UP (ref 0–6)
ESTIMATED AVERAGE GLUCOSE: 114 MG/DL — SIGNIFICANT CHANGE UP (ref 68–114)
GLUCOSE SERPL-MCNC: 126 MG/DL — HIGH (ref 70–99)
HCT VFR BLD CALC: 40.5 % — SIGNIFICANT CHANGE UP (ref 39–50)
HDLC SERPL-MCNC: 34 MG/DL — LOW
HGB BLD-MCNC: 13.1 G/DL — SIGNIFICANT CHANGE UP (ref 13–17)
IMM GRANULOCYTES NFR BLD AUTO: 0.3 % — SIGNIFICANT CHANGE UP (ref 0–1.5)
INR BLD: 1.01 — SIGNIFICANT CHANGE UP (ref 0.88–1.16)
LIPID PNL WITH DIRECT LDL SERPL: 99 MG/DL — SIGNIFICANT CHANGE UP
LYMPHOCYTES # BLD AUTO: 1.63 K/UL — SIGNIFICANT CHANGE UP (ref 1–3.3)
LYMPHOCYTES # BLD AUTO: 24.7 % — SIGNIFICANT CHANGE UP (ref 13–44)
MCHC RBC-ENTMCNC: 29.9 PG — SIGNIFICANT CHANGE UP (ref 27–34)
MCHC RBC-ENTMCNC: 32.3 GM/DL — SIGNIFICANT CHANGE UP (ref 32–36)
MCV RBC AUTO: 92.5 FL — SIGNIFICANT CHANGE UP (ref 80–100)
MONOCYTES # BLD AUTO: 0.51 K/UL — SIGNIFICANT CHANGE UP (ref 0–0.9)
MONOCYTES NFR BLD AUTO: 7.7 % — SIGNIFICANT CHANGE UP (ref 2–14)
NEUTROPHILS # BLD AUTO: 4.1 K/UL — SIGNIFICANT CHANGE UP (ref 1.8–7.4)
NEUTROPHILS NFR BLD AUTO: 62.2 % — SIGNIFICANT CHANGE UP (ref 43–77)
NON HDL CHOLESTEROL: 128 MG/DL — SIGNIFICANT CHANGE UP
NRBC # BLD: 0 /100 WBCS — SIGNIFICANT CHANGE UP (ref 0–0)
PLATELET # BLD AUTO: 356 K/UL — SIGNIFICANT CHANGE UP (ref 150–400)
POTASSIUM SERPL-MCNC: 4 MMOL/L — SIGNIFICANT CHANGE UP (ref 3.5–5.3)
POTASSIUM SERPL-SCNC: 4 MMOL/L — SIGNIFICANT CHANGE UP (ref 3.5–5.3)
PROT SERPL-MCNC: 7.5 G/DL — SIGNIFICANT CHANGE UP (ref 6–8.3)
PROTHROM AB SERPL-ACNC: 12.1 SEC — SIGNIFICANT CHANGE UP (ref 10.6–13.6)
RBC # BLD: 4.38 M/UL — SIGNIFICANT CHANGE UP (ref 4.2–5.8)
RBC # FLD: 13.3 % — SIGNIFICANT CHANGE UP (ref 10.3–14.5)
SODIUM SERPL-SCNC: 140 MMOL/L — SIGNIFICANT CHANGE UP (ref 135–145)
TRIGL SERPL-MCNC: 146 MG/DL — SIGNIFICANT CHANGE UP
WBC # BLD: 6.59 K/UL — SIGNIFICANT CHANGE UP (ref 3.8–10.5)
WBC # FLD AUTO: 6.59 K/UL — SIGNIFICANT CHANGE UP (ref 3.8–10.5)

## 2021-05-26 PROCEDURE — 93010 ELECTROCARDIOGRAM REPORT: CPT

## 2021-05-26 RX ORDER — ATORVASTATIN CALCIUM 80 MG/1
40 TABLET, FILM COATED ORAL AT BEDTIME
Refills: 0 | Status: DISCONTINUED | OUTPATIENT
Start: 2021-05-26 | End: 2021-05-27

## 2021-05-26 RX ORDER — PANTOPRAZOLE SODIUM 20 MG/1
40 TABLET, DELAYED RELEASE ORAL
Refills: 0 | Status: DISCONTINUED | OUTPATIENT
Start: 2021-05-26 | End: 2021-05-27

## 2021-05-26 RX ORDER — ALLOPURINOL 300 MG
100 TABLET ORAL
Refills: 0 | Status: DISCONTINUED | OUTPATIENT
Start: 2021-05-26 | End: 2021-05-27

## 2021-05-26 RX ORDER — SODIUM CHLORIDE 9 MG/ML
500 INJECTION INTRAMUSCULAR; INTRAVENOUS; SUBCUTANEOUS
Refills: 0 | Status: DISCONTINUED | OUTPATIENT
Start: 2021-05-26 | End: 2021-05-26

## 2021-05-26 RX ORDER — OMEPRAZOLE 10 MG/1
1 CAPSULE, DELAYED RELEASE ORAL
Qty: 0 | Refills: 0 | DISCHARGE

## 2021-05-26 RX ORDER — SODIUM CHLORIDE 9 MG/ML
500 INJECTION INTRAMUSCULAR; INTRAVENOUS; SUBCUTANEOUS
Refills: 0 | Status: DISCONTINUED | OUTPATIENT
Start: 2021-05-26 | End: 2021-05-27

## 2021-05-26 RX ORDER — APIXABAN 2.5 MG/1
5 TABLET, FILM COATED ORAL
Refills: 0 | Status: DISCONTINUED | OUTPATIENT
Start: 2021-05-27 | End: 2021-05-27

## 2021-05-26 RX ORDER — GEMFIBROZIL 600 MG
600 TABLET ORAL
Refills: 0 | Status: DISCONTINUED | OUTPATIENT
Start: 2021-05-26 | End: 2021-05-27

## 2021-05-26 RX ORDER — CLOPIDOGREL BISULFATE 75 MG/1
600 TABLET, FILM COATED ORAL ONCE
Refills: 0 | Status: COMPLETED | OUTPATIENT
Start: 2021-05-26 | End: 2021-05-26

## 2021-05-26 RX ORDER — FOLIC ACID 0.8 MG
1 TABLET ORAL DAILY
Refills: 0 | Status: DISCONTINUED | OUTPATIENT
Start: 2021-05-26 | End: 2021-05-27

## 2021-05-26 RX ORDER — FERROUS SULFATE 325(65) MG
1 TABLET ORAL
Qty: 0 | Refills: 0 | DISCHARGE

## 2021-05-26 RX ORDER — POTASSIUM CHLORIDE 20 MEQ
1 PACKET (EA) ORAL
Qty: 0 | Refills: 0 | DISCHARGE

## 2021-05-26 RX ORDER — ASPIRIN/CALCIUM CARB/MAGNESIUM 324 MG
325 TABLET ORAL ONCE
Refills: 0 | Status: COMPLETED | OUTPATIENT
Start: 2021-05-26 | End: 2021-05-26

## 2021-05-26 RX ORDER — ASPIRIN/CALCIUM CARB/MAGNESIUM 324 MG
81 TABLET ORAL DAILY
Refills: 0 | Status: DISCONTINUED | OUTPATIENT
Start: 2021-05-26 | End: 2021-05-27

## 2021-05-26 RX ORDER — METOPROLOL TARTRATE 50 MG
25 TABLET ORAL DAILY
Refills: 0 | Status: DISCONTINUED | OUTPATIENT
Start: 2021-05-26 | End: 2021-05-27

## 2021-05-26 RX ORDER — CHOLECALCIFEROL (VITAMIN D3) 125 MCG
1000 CAPSULE ORAL DAILY
Refills: 0 | Status: DISCONTINUED | OUTPATIENT
Start: 2021-05-26 | End: 2021-05-27

## 2021-05-26 RX ORDER — CLOPIDOGREL BISULFATE 75 MG/1
75 TABLET, FILM COATED ORAL DAILY
Refills: 0 | Status: DISCONTINUED | OUTPATIENT
Start: 2021-05-27 | End: 2021-05-27

## 2021-05-26 RX ORDER — AMLODIPINE BESYLATE 2.5 MG/1
10 TABLET ORAL DAILY
Refills: 0 | Status: DISCONTINUED | OUTPATIENT
Start: 2021-05-26 | End: 2021-05-27

## 2021-05-26 RX ADMIN — AMLODIPINE BESYLATE 10 MILLIGRAM(S): 2.5 TABLET ORAL at 16:07

## 2021-05-26 RX ADMIN — CLOPIDOGREL BISULFATE 600 MILLIGRAM(S): 75 TABLET, FILM COATED ORAL at 09:40

## 2021-05-26 RX ADMIN — Medication 1 MILLIGRAM(S): at 16:07

## 2021-05-26 RX ADMIN — Medication 25 MILLIGRAM(S): at 16:07

## 2021-05-26 RX ADMIN — ATORVASTATIN CALCIUM 40 MILLIGRAM(S): 80 TABLET, FILM COATED ORAL at 21:28

## 2021-05-26 RX ADMIN — Medication 325 MILLIGRAM(S): at 09:40

## 2021-05-26 RX ADMIN — SODIUM CHLORIDE 50 MILLILITER(S): 9 INJECTION INTRAMUSCULAR; INTRAVENOUS; SUBCUTANEOUS at 09:40

## 2021-05-26 RX ADMIN — Medication 600 MILLIGRAM(S): at 17:47

## 2021-05-26 RX ADMIN — Medication 1000 UNIT(S): at 17:47

## 2021-05-26 RX ADMIN — SODIUM CHLORIDE 75 MILLILITER(S): 9 INJECTION INTRAMUSCULAR; INTRAVENOUS; SUBCUTANEOUS at 14:10

## 2021-05-26 RX ADMIN — PANTOPRAZOLE SODIUM 40 MILLIGRAM(S): 20 TABLET, DELAYED RELEASE ORAL at 17:47

## 2021-05-26 NOTE — DISCHARGE NOTE PROVIDER - CARE PROVIDERS DIRECT ADDRESSES
,DirectAddress_Unknown ,DirectAddress_Unknown,[email or web address]

## 2021-05-26 NOTE — CONSULT NOTE ADULT - SUBJECTIVE AND OBJECTIVE BOX
Vascular Attending:        HPI:  COVID: Received Moderna vaccine, last dose 04/26/2021  Cardiologist: Dr. Zambrano  Pharmacy: [facility], [phone]  Escort: Son in law   81 y/o Chinese speaking male w/ PMHx HTN, HLD, A-Fib (on Eliquis, last dose 05/24/2021 PM), known carotid bruit, chronic HFpEF, COPD (denies hospitalizations/intubation), GERD, and BLANQUITA (unknown baseline Hgb) who  presented to outpatient cardiologist, Dr. Zambrano, c/o intermittent claudication w/ severe cramping in lower extremities, left worse than right, upon ambulation of 1-2 blocks, and relieved w/ rest. Patient also admits to occasional pain at night. Patient states pain is located in the hip and thighs. Patient denies any fevers, chills, sick contacts, recent travel, chest pain, SOB, palpitations, orthopnea, lightheadedness, nauea/vomiting, LE edema, skin changes, and ulcerations. 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    In light of patient's risk factors, Rutheford Grade II Category 3 symptoms, and abnormal Arterial Duplex results and CTA Abdominal results, patient is  now referred to [facility] for peripheral angiogram w/ possible intervention if clinically indicated.    (24 May 2021 15:29)    Patient denies having any abdominal pain, N/V, melena, chest pain, or back pain    PAST MEDICAL & SURGICAL HISTORY:  Chronic atrial fibrillation    Carotid bruit    Chronic heart failure with preserved ejection fraction (HFpEF)    COPD (chronic obstructive pulmonary disease)    HTN (hypertension)    HLD (hyperlipidemia)    Diabetes    Iron deficiency anemia    S/P cholecystectomy    H/O knee surgery  Right    MEDICATIONS  (STANDING):  amLODIPine   Tablet 10 milliGRAM(s) Oral daily  aspirin enteric coated 81 milliGRAM(s) Oral daily  atorvastatin 40 milliGRAM(s) Oral at bedtime  chlorhexidine 4% Liquid 1 Application(s) Topical once  cholecalciferol 1000 Unit(s) Oral daily  folic acid 1 milliGRAM(s) Oral daily  gemfibrozil 600 milliGRAM(s) Oral two times a day  metoprolol succinate ER 25 milliGRAM(s) Oral daily  pantoprazole    Tablet 40 milliGRAM(s) Oral before breakfast  sodium chloride 0.9%. 500 milliLiter(s) (75 mL/Hr) IV Continuous <Continuous>    MEDICATIONS  (PRN):  allopurinol 100 milliGRAM(s) Oral two times a day PRN joint pain      Allergies    No Known Allergies    Intolerances        SOCIAL HISTORY:    FAMILY HISTORY:  No pertinent family history in first degree relatives        Vital Signs Last 24 Hrs  T(C): --  T(F): --  HR: 88 (26 May 2021 14:54) (84 - 88)  BP: 136/85 (26 May 2021 14:54) (136/85 - 147/87)  BP(mean): --  RR: 18 (26 May 2021 14:54) (18 - 18)  SpO2: 99% (26 May 2021 14:54) (98% - 99%)    PHYSICAL EXAM:  Gen: NAD, resting in bed  Chest: normal respiratory effort on RA  CV: RRR  Abd: soft, NT, ND  Ext: warm and well perfused, R groin access soft.      LABS:                        13.1   6.59  )-----------( 356      ( 26 May 2021 08:33 )             40.5     05-26    140  |  107  |  22  ----------------------------<  126<H>  4.0   |  22  |  1.19    Ca    9.4      26 May 2021 08:33    TPro  7.5  /  Alb  4.6  /  TBili  0.3  /  DBili  0.2  /  AST  17  /  ALT  11  /  AlkPhos  150<H>  05-26    PT/INR - ( 26 May 2021 08:33 )   PT: 12.1 sec;   INR: 1.01          PTT - ( 26 May 2021 08:33 )  PTT:34.8 sec      RADIOLOGY & ADDITIONAL STUDIES Vascular Attending:  Dr. Shepherd      HPI:  COVID: Received Moderna vaccine, last dose 04/26/2021  Cardiologist: Dr. Zambrano  Pharmacy: [facility], [phone]  Escort: Son in law   81 y/o Yi speaking male w/ PMHx HTN, HLD, A-Fib (on Eliquis, last dose 05/24/2021 PM), known carotid bruit, chronic HFpEF, COPD (denies hospitalizations/intubation), GERD, and BLANQUITA (unknown baseline Hgb) who  presented to outpatient cardiologist, Dr. Zambrano, c/o intermittent claudication w/ severe cramping in lower extremities, left worse than right, upon ambulation of 1-2 blocks, and relieved w/ rest. Patient also admits to occasional pain at night. Patient states pain is located in the hip and thighs. Patient denies any fevers, chills, sick contacts, recent travel, chest pain, SOB, palpitations, orthopnea, lightheadedness, nauea/vomiting, LE edema, skin changes, and ulcerations. 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    In light of patient's risk factors, Rutheford Grade II Category 3 symptoms, and abnormal Arterial Duplex results and CTA Abdominal results, patient is  now referred to [facility] for peripheral angiogram w/ possible intervention if clinically indicated.    (24 May 2021 15:29)    Patient denies having any abdominal pain, N/V, melena, chest pain, or back pain    PAST MEDICAL & SURGICAL HISTORY:  Chronic atrial fibrillation    Carotid bruit    Chronic heart failure with preserved ejection fraction (HFpEF)    COPD (chronic obstructive pulmonary disease)    HTN (hypertension)    HLD (hyperlipidemia)    Diabetes    Iron deficiency anemia    S/P cholecystectomy    H/O knee surgery  Right    MEDICATIONS  (STANDING):  amLODIPine   Tablet 10 milliGRAM(s) Oral daily  aspirin enteric coated 81 milliGRAM(s) Oral daily  atorvastatin 40 milliGRAM(s) Oral at bedtime  chlorhexidine 4% Liquid 1 Application(s) Topical once  cholecalciferol 1000 Unit(s) Oral daily  folic acid 1 milliGRAM(s) Oral daily  gemfibrozil 600 milliGRAM(s) Oral two times a day  metoprolol succinate ER 25 milliGRAM(s) Oral daily  pantoprazole    Tablet 40 milliGRAM(s) Oral before breakfast  sodium chloride 0.9%. 500 milliLiter(s) (75 mL/Hr) IV Continuous <Continuous>    MEDICATIONS  (PRN):  allopurinol 100 milliGRAM(s) Oral two times a day PRN joint pain      Allergies    No Known Allergies    Intolerances        SOCIAL HISTORY:    FAMILY HISTORY:  No pertinent family history in first degree relatives        Vital Signs Last 24 Hrs  T(C): --  T(F): --  HR: 88 (26 May 2021 14:54) (84 - 88)  BP: 136/85 (26 May 2021 14:54) (136/85 - 147/87)  BP(mean): --  RR: 18 (26 May 2021 14:54) (18 - 18)  SpO2: 99% (26 May 2021 14:54) (98% - 99%)    PHYSICAL EXAM:  Gen: NAD, resting in bed  Chest: normal respiratory effort on RA  CV: RRR  Abd: soft, NT, ND  Ext: warm and well perfused, R groin access soft.      LABS:                        13.1   6.59  )-----------( 356      ( 26 May 2021 08:33 )             40.5     05-26    140  |  107  |  22  ----------------------------<  126<H>  4.0   |  22  |  1.19    Ca    9.4      26 May 2021 08:33    TPro  7.5  /  Alb  4.6  /  TBili  0.3  /  DBili  0.2  /  AST  17  /  ALT  11  /  AlkPhos  150<H>  05-26    PT/INR - ( 26 May 2021 08:33 )   PT: 12.1 sec;   INR: 1.01          PTT - ( 26 May 2021 08:33 )  PTT:34.8 sec      RADIOLOGY & ADDITIONAL STUDIES

## 2021-05-26 NOTE — DISCHARGE NOTE PROVIDER - NSDCMRMEDTOKEN_GEN_ALL_CORE_FT
allopurinol 100 mg oral tablet: 1 tab(s) orally 2 times a day, As Needed  amLODIPine 10 mg oral tablet: 1 tab(s) orally once a day  Eliquis 5 mg oral tablet: 1 tab(s) orally 2 times a day  folic acid 1 mg oral tablet: 1 tab(s) orally once a day  gemfibrozil 600 mg oral tablet: 1 tab(s) orally 2 times a day  Linzess 145 mcg oral capsule: 1 cap(s) orally once a day, As Needed  omeprazole 40 mg oral delayed release capsule: 1 cap(s) orally once a day  Toprol-XL 25 mg oral tablet, extended release: 1 tab(s) orally once a day  Vitamin D3 1000 intl units (25 mcg) oral capsule: 1 tab(s) orally once a day   allopurinol 100 mg oral tablet: 1 tab(s) orally 2 times a day, As Needed  amLODIPine 10 mg oral tablet: 1 tab(s) orally once a day  aspirin 81 mg oral delayed release tablet: 1 tab(s) orally once a day (LAST DOSE 6/1/21)  atorvastatin 40 mg oral tablet: 1 tab(s) orally once a day (at bedtime)  clopidogrel 75 mg oral tablet: 1 tab(s) orally once a day  Eliquis 5 mg oral tablet: 1 tab(s) orally 2 times a day  folic acid 1 mg oral tablet: 1 tab(s) orally once a day  gemfibrozil 600 mg oral tablet: 1 tab(s) orally 2 times a day  Linzess 145 mcg oral capsule: 1 cap(s) orally once a day, As Needed  omeprazole 40 mg oral delayed release capsule: 1 cap(s) orally once a day  Toprol-XL 25 mg oral tablet, extended release: 1 tab(s) orally once a day  Vitamin D3 1000 intl units (25 mcg) oral capsule: 1 tab(s) orally once a day

## 2021-05-26 NOTE — DISCHARGE NOTE PROVIDER - NSDCACTIVITY_GEN_ALL_CORE
Showering allowed/Driving allowed/No heavy lifting/straining Showering allowed/No heavy lifting/straining

## 2021-05-26 NOTE — DISCHARGE NOTE PROVIDER - CARE PROVIDER_API CALL
Jamila Zambrano)  Cardiology; Internal Medicine; Interventional Cardiology  [address] [place] [address]  Phone: [phone]  Fax: [phone]  Follow Up Time: 1 week   Jamila Zambrano)  Cardiology; Internal Medicine; Interventional Cardiology  [address] [address]  Phone: [phone]  Fax: [phone]  Follow Up Time: 1 week    Magdaleno Shepherd)  Surgery; Vascular Surgery  [address], [address] [place] [address]  Phone: [phone]  Fax: [phone]  Follow Up Time: 1 week

## 2021-05-26 NOTE — CONSULT NOTE ADULT - ASSESSMENT
79 y/o Telugu speaking male w/ PMHx HTN, HLD, A-Fib (on Eliquis, last dose 05/24/2021 PM), known carotid bruit, chronic HFpEF, COPD (denies hospitalizations/intubation), GERD, and BLANQUITA (unknown baseline Hgb) who presented for peripheral angiogram 79 y/o Urdu speaking male w/ PMHx HTN, HLD, A-Fib (on Eliquis, last dose 05/24/2021 PM), known carotid bruit, chronic HFpEF, COPD (denies hospitalizations/intubation), GERD, and BLANQUITA (unknown baseline Hgb) who is POD#0 s/p s/p peripheral  angiogram on 5/26/21 Laser/DCB to L Popliteal (90-95%) and laser/PTA to L AT(90-95%) with asymptomatic AAA found on preop CT    - pending 79 y/o Thai speaking male w/ PMHx HTN, HLD, A-Fib (on Eliquis, last dose 05/24/2021 PM), known carotid bruit, chronic HFpEF, COPD (denies hospitalizations/intubation), GERD, and BLANQUITA (unknown baseline Hgb) who is POD#0 s/p s/p peripheral  angiogram on 5/26/21 Laser/DCB to L Popliteal (90-95%) and laser/PTA to L AT(90-95%) with asymptomatic AAA found on preop CT    - No acute vascular surgery intervention. patient asymptomatic  - will see with Dr. Shepherd in the am.  - will continue to follow 79 y/o Kazakh speaking male w/ PMHx HTN, HLD, A-Fib (on Eliquis, last dose 05/24/2021 PM), known carotid bruit, chronic HFpEF, COPD (denies hospitalizations/intubation), GERD, and BLANQUITA (unknown baseline Hgb) who is POD#0 s/p s/p peripheral  angiogram on 5/26/21 Laser/DCB to L Popliteal (90-95%) and laser/PTA to L AT(90-95%) with asymptomatic AAA found on preop CT    - No acute vascular surgery intervention. patient asymptomatic  - Can f/u with Dr. Shepherd in 2 weeks after d/c. [phone]  - Vascular Surgery will sign off

## 2021-05-26 NOTE — DISCHARGE NOTE PROVIDER - HOSPITAL COURSE
**INCOMPLETE**    '79 y/o Afghan speaking male w/ PMHx HTN, HLD, A-Fib (on Eliquis, last dose 05/24/2021 PM), known carotid bruit, chronic HFpEF, COPD (denies hospitalizations/intubation), GERD, and BLANQUITA (unknown baseline Hgb) who  presented to outpatient cardiologist, Dr. Zambrano, c/o intermittent claudication w/ severe cramping in lower extremities, left worse than right, upon ambulation of 1-2 blocks, and relieved w/ rest. Patient also admits to occasional pain at night. Patient states pain is located in the hip and thighs. Patient denies any fevers, chills, sick contacts, recent travel, chest pain, SOB, palpitations, orthopnea, lightheadedness, nauea/vomiting, LE edema, skin changes, and ulcerations. 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      Patient was referred for peripheral angiogram on 5/26/21  revealing Laser/DCB to L Popliteal (90-95%)and laser/PTA to L AT(90-95%), procedure done via R groin hemostasis achieved w/ perclose closure device. Pt. seen and examined at bedside this morning, without complaints and is out of bed ambulating. right groin  stable without bleeding or hematoma, distal pulses intact. Vital signs stable, labs and telemetry reviewed.       Prior to arrival patient had CTA revealing pelvis 4.5 cm infrarenal abdominal aortic aneurysm with mural thrombus. The aneurysm measures 5 cm in length and is 2.7 cm distal to the origin of the renal arteries, Patient was seen by vascular team who recommended _________      Home medication regime reviewed with Dr. Gallagher and patient is to continue taking Aspirin/Plavix/ELiquis x 1 week, then discontinue Aspirin and continue onPlavix 75mg and Eliquis 5mg BID, patient started on Atorvastatin 40mg daily  Pt. stable for discharge as per Dr. Gallagher and to f/u with Dr. Zambrano in 1-2 weeks. Patient has been given appropriate discharge instructions including medication regimen, access site management and follow up. Prescriptions have been e-prescribed to patient's preferred pharmacy.               Dx: Heart Failure this Admit, History of Heart Failure, Unstable Angina/ACS/NSTEMI/STEMI: YES/NO            If YES: 7 day Follow-Up Appointment Made: Yes/No, Yes: Date/Time; IF No, why not?           Cardiac Rehab (STEMI/NSTEMI/ACS/Unstable Angina/CHF):            *Education on benefits of Cardiac Rehab provided to patient: Yes/No         *Referral and Prescription Given for Cardiac Rehab : Yes/No.  If No, Why Not?         *Pt given list of locations & instructed to contact their insurance company to review list of participating providers    Reasons for No Cardiac Rehab Referral Rx (Must select 1 or more options):                Patient Refused            Medical Reason: ex needs Home Care, Home PT            Patient lacks medical coverage for Cardiac Rehab            Pt discharged to Nursing Care/SERAFIN/Long term Care Facility            Patient Lacks Transportation or no cardiac rehab within 60 minutes driving range            Patient already participates in Cardiac Rehab            Other: (provide details) ex: Hospice patient      AMI: Beta Blocker Prescribed: Yes/No. If no, Why not?            ACE-I/ARB Prescribed: Yes/No. If no, Why not?    Statin Prescribed (STEMI/NSTEMI/UA &/OR PCI this admission):  Yes/No; If No, Why Not?  DAPT: Prescriptions for Aspirin/Plavix/Brilinta/Effient e-prescribed to patient's pharmacy Yes/No__.                *No Aspirin/Plavix/Brilinta/Effient prescribed due to ___.       **INCOMPLETE**    81yo Iraqi speaking Male w/ PMHx HTN, HLD, A-Fib (on Eliquis), chronic HFpEF, COPD, GERD, and BLANQUITA who initially presented to his cardiologist Dr. Zambrano c/o intermittent claudication w/ severe LE cramping (L>R), upon ambulation of 1-2 blocks, and relieved w/ rest. Pt also admits to occasional pain at night, located in the hip and thighs. He denies any LE edema, LE erythema, unhealed ulcers, change in skin texture/loss of hair, fevers, chills, sick contacts, CP, SOB, palpitations, orthopnea, lightheadedness. Arterial Duplex revealed severe Left iliac disease >75% stenosis of the left popliteal artery, and moderate-severe right CFA disease. 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  Pt was referred for peripheral angiogram on 5/26/21 receiving Laser/DCB to L Popliteal (90-95%) and laser/PTA to L AT(90-95%), access R groin PC. Pt to c/w triple therapy (ASA/Plavix/Eliquis) x 1 week and discontinue ASA. Vascular consulted for known 4.5 cm infrarenal abdominal aortic aneurysm with mural thrombus, and recommended ________. Pt seen and examined at bedside this AM without any complaints or events overnight, VSS, labs and telemetry reviewed and pt stable for discharge as discussed with . ___. Pt has received appropriate discharge instructions, including medication regimen, access site management and follow up with Dr. Zambrano in 1-2 weeks.     81yo Syriac speaking Male w/ PMHx HTN, HLD, A-Fib (on Eliquis), chronic HFpEF, COPD, GERD, and BLANQUITA who initially presented to his cardiologist Dr. Zambrano c/o intermittent claudication w/ severe LE cramping (L>R), upon ambulation of 1-2 blocks, and relieved w/ rest. Pt also admits to occasional pain at night, located in the hip and thighs. He denies any LE edema, LE erythema, unhealed ulcers, change in skin texture/loss of hair, fevers, chills, sick contacts, CP, SOB, palpitations, orthopnea, lightheadedness. Arterial Duplex revealed severe Left iliac disease >75% stenosis of the left popliteal artery, and moderate-severe right CFA disease. 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  Pt was referred for peripheral angiogram on 5/26/21 receiving Laser/DCB to L Popliteal (90-95%) and laser/PTA to L AT(90-95%), access R groin PC. Pt to c/w triple therapy (ASA/Plavix/Eliquis) x 1 week and discontinue ASA. Vascular consulted for known 4.5 cm infrarenal abdominal aortic aneurysm with mural thrombus, no surgical intervention required at this time, pt will f/u with Dr. Shepherd in 1 week. Pt seen and examined at bedside this AM without any complaints or events overnight, VSS, labs and telemetry reviewed and pt stable for discharge as discussed with Dr. Gallagher. Pt has received appropriate discharge instructions, including medication regimen, access site management and follow up with Dr. Zambrano in 1-2 weeks and Dr. Shepherd in 1 week.

## 2021-05-26 NOTE — DISCHARGE NOTE PROVIDER - NSDCCPCAREPLAN_GEN_ALL_CORE_FT
PRINCIPAL DISCHARGE DIAGNOSIS  Diagnosis: Peripheral arterial disease  Assessment and Plan of Treatment: -You underwent a peripheral angiogram on 5/26 and the blockage in your L popliteal and anterior tibial was opened with ballooning. PLEASE CONTINUE ASPIRIN AND PLAVIX X1 WEEK, THEN DISCONTINUE ASPIRIN. DO NOT MISS A DOSE OF ASPIRIN OR PLAVIX; IF YOU DO, YOU ARE AT RISK OF YOUR ARTERY CLOSING. DO NOT STOP THESE TWO MEDICATIONS UNLESS INSTRUCTED TO DO SO BY YOUR CARDIOLOGIST. Your procedure was done through your groin. You do not need to keep this area covered and you may shower. Please avoid any heavy lifting  (no more than 3 to 5 lbs) or strenuous activity for five days. If you develop any swelling, bleeding, hardening of the skin (hematoma formation), acute pain, numbness/tingling  in your leg please contact your doctor immediately or call our 24/7 line: [phone] Please return to the hospital/seek immediate medical attention if worsening of symptoms- including not limited to chest pain, shortness of breath. Please follow up with Dr. Zambrano in 1-2 weeks. Please call her office and make an appointment to see him. Please continue a heart healthy diet low in sodium, cholesterol, and fat.      SECONDARY DISCHARGE DIAGNOSES  Diagnosis: AAA (abdominal aortic aneurysm)  Assessment and Plan of Treatment:     Diagnosis: Atrial fibrillation  Assessment and Plan of Treatment: People with atrial fibrillation are at an increased risk of forming a blood clot in the heart, which can travel to the brain, causing a stroke, or to other parts of the body. ELIQUIS lowers your chance of having a stroke by helping to prevent clots from forming. If you stop taking ELIQUIS, you may have increased risk of forming a blood clot in your heart which can cause a stroke. Do not stop taking ELIQUIS without talking to your cardiologist. Additionally it is important to make sure your heart rate stays controlled, please continue taking metoprolol succinate 25mg daily to keep your heart rate controlled.    Diagnosis: Hypertension  Assessment and Plan of Treatment: Hypertension, commonly called high blood pressure, is when the force of blood pumping through your arteries is too strong. Hypertension forces your heart to work harder to pump blood. Your arteries may become narrow or stiff. Having untreated or uncontrolled hypertension for a long period of time can cause heart attack, stroke, kidney disease, and other problems.   Please continue to taking Metoprolol succinate 25mg daily, amlodipine 10mg daily   Maintain a healthy lifestyle and follow up with your primary care physician. For blood pressures at home that are too high or low please see your doctor or go to the Emergency Room as necessary.     PRINCIPAL DISCHARGE DIAGNOSIS  Diagnosis: Peripheral arterial disease  Assessment and Plan of Treatment: You were found to have blockages in the arteries of your legs, also known as Pheripheral Arterial Disease. You underwent a peripheral catheterization on 5/26/21 and received a stent to the ___ artery.  PLEASE CONTINUE ASPIRIN 81MG DAILY FOR 1 WEEK (STOP AFTER __).  PLEASE CONTINUE PLAVIX 75MG DAILY. DO NOT STOP THIS MEDICATIONS FOR ANY REASON AS IT IS KEEPING YOUR STENT OPEN.  Avoid strenuous activity or heavy lifting anything more than 5lbs for the next five days. Do not take a bath or swim for the next five days; you may shower. For any bleeding or hematoma formation (hardened blood collection under the skin) at the access site of your right groin please hold pressure and go to the emergency room. Please follow up with Dr. Zambrano in 1-2 weeks. For recurrent chest pain, please call your doctor or go to the emergency room.      SECONDARY DISCHARGE DIAGNOSES  Diagnosis: AAA (abdominal aortic aneurysm)  Assessment and Plan of Treatment:     Diagnosis: Atrial fibrillation  Assessment and Plan of Treatment: You have an abnormal heart rhythm (arrhythmia) called atrial fibrillation. With this condition, the hearts 2 upper chambers (the atria) quiver rather than squeeze the blood out in a normal pattern. This leads to an irregular and sometimes rapid heartbeat. Atrial fibrillation is serious condition as it affects the heart’s ability to fill with blood as it should and blood clots may form, which increases the risk for stroke.  PLEASE CONTINUE  ELIQUIS 5MG TWICE A DAY TO PREVENT A STROKE.  PLEASE CONTINUE METOPROLOL SUCCINATE (TOPROL XL) 25MG DAILY TO KEEP YOUR HEART RATE REGULAR.    Diagnosis: Hypertension  Assessment and Plan of Treatment: Please continue AMLODIPINE 10MG DAILY as listed to keep your blood pressure controlled. For blood pressure that is too high or too low please see your doctor or go to the emergency room as necessary.     PRINCIPAL DISCHARGE DIAGNOSIS  Diagnosis: Peripheral arterial disease  Assessment and Plan of Treatment: You were found to have blockages in the arteries of your legs, also known as Pheripheral Arterial Disease. You underwent a peripheral catheterization on 5/26/21 and received laser and ballooning to the left popliteal artery and to the left anterior tibial artery.  PLEASE CONTINUE ASPIRIN 81MG DAILY FOR 1 WEEK (STOP AFTER 6/1/21).  PLEASE CONTINUE PLAVIX 75MG DAILY. DO NOT STOP THIS MEDICATIONS FOR ANY REASON AS IT IS KEEPING YOUR STENT OPEN.  Avoid strenuous activity or heavy lifting anything more than 5lbs for the next five days. Do not take a bath or swim for the next five days; you may shower. For any bleeding or hematoma formation (hardened blood collection under the skin) at the access site of your right groin please hold pressure and go to the emergency room. Please follow up with Dr. Zambrano in 1-2 weeks. For recurrent chest pain, please call your doctor or go to the emergency room.      SECONDARY DISCHARGE DIAGNOSES  Diagnosis: AAA (abdominal aortic aneurysm)  Assessment and Plan of Treatment:     Diagnosis: Atrial fibrillation  Assessment and Plan of Treatment: You have an abnormal heart rhythm (arrhythmia) called atrial fibrillation. With this condition, the hearts 2 upper chambers (the atria) quiver rather than squeeze the blood out in a normal pattern. This leads to an irregular and sometimes rapid heartbeat. Atrial fibrillation is serious condition as it affects the heart’s ability to fill with blood as it should and blood clots may form, which increases the risk for stroke.  PLEASE CONTINUE  ELIQUIS 5MG TWICE A DAY TO PREVENT A STROKE.  PLEASE CONTINUE METOPROLOL SUCCINATE (TOPROL XL) 25MG DAILY TO KEEP YOUR HEART RATE REGULAR.    Diagnosis: Hypertension  Assessment and Plan of Treatment: Please continue AMLODIPINE 10MG DAILY  AND METOPROLOL SUCCINATE 25MG DAILY as listed to keep your blood pressure controlled. For blood pressure that is too high or too low please see your doctor or go to the emergency room as necessary.     PRINCIPAL DISCHARGE DIAGNOSIS  Diagnosis: Peripheral arterial disease  Assessment and Plan of Treatment: You were found to have blockages in the arteries of your legs, also known as Pheripheral Arterial Disease. You underwent a peripheral catheterization on 5/26/21 and received laser and ballooning to the left popliteal artery and to the left anterior tibial artery.  PLEASE CONTINUE ASPIRIN 81MG DAILY FOR 1 WEEK (STOP AFTER 6/1/21).  PLEASE CONTINUE PLAVIX 75MG DAILY. DO NOT STOP THIS MEDICATIONS FOR ANY REASON AS IT IS KEEPING YOUR STENT OPEN.  Avoid strenuous activity or heavy lifting anything more than 5lbs for the next five days. Do not take a bath or swim for the next five days; you may shower. For any bleeding or hematoma formation (hardened blood collection under the skin) at the access site of your right groin please hold pressure and go to the emergency room. Please follow up with Dr. Zambrano in 1-2 weeks. For recurrent chest pain, please call your doctor or go to the emergency room.      SECONDARY DISCHARGE DIAGNOSES  Diagnosis: AAA (abdominal aortic aneurysm)  Assessment and Plan of Treatment: You were found to have an abdominal aortic aneurysm (AAA) on a CT scan. An aortic aneurysm occurs when the walls of the main blood vessel that carries blood away from the heart (the aorta) bulge or dilate. Aneurysms generally cause no health problems, however large AAAs can burst, or rupture, and cause heavy bleeding into the abdomen. Your aneurysm is 4.5cm and does not require surgical repair at this moment. You must have a repeat CT scan of your abdomen in 6 months to monitor the size progression.  It is important that you do not smoke and keep your blood pressure under control to prevent the aneurysm from getting bigger. Please continue to take your blood pressure medications as listed.  Please follow up with the Vascular surgeon Dr. Shepherd in 1 week to discuss further management.   If you experience any lightheadedness, dizziness, loss of conciousenss, or back pain please go to the nearest emergency room.    Diagnosis: Hypertension  Assessment and Plan of Treatment: Please continue AMLODIPINE 10MG DAILY  AND METOPROLOL SUCCINATE 25MG DAILY as listed to keep your blood pressure controlled. For blood pressure that is too high or too low please see your doctor or go to the emergency room as necessary.    Diagnosis: Atrial fibrillation  Assessment and Plan of Treatment: You have an abnormal heart rhythm (arrhythmia) called atrial fibrillation. With this condition, the hearts 2 upper chambers (the atria) quiver rather than squeeze the blood out in a normal pattern. This leads to an irregular and sometimes rapid heartbeat. Atrial fibrillation is serious condition as it affects the heart’s ability to fill with blood as it should and blood clots may form, which increases the risk for stroke.  PLEASE CONTINUE  ELIQUIS 5MG TWICE A DAY TO PREVENT A STROKE.  PLEASE CONTINUE METOPROLOL SUCCINATE (TOPROL XL) 25MG DAILY TO KEEP YOUR HEART RATE REGULAR.    Diagnosis: HLD (hyperlipidemia)  Assessment and Plan of Treatment: Please START ATORVASTATIN 40MG AT BEDTIME AND CONTINUE GEMFIBROZIL 600MG TWICE DAILY to keep your cholesterol low. High cholesterol contributes to heart disease.

## 2021-05-26 NOTE — DISCHARGE NOTE PROVIDER - PROVIDER TOKENS
PROVIDER:[TOKEN:[[id number]],FOLLOWUP:[1 week]] PROVIDER:[TOKEN:[[id number]],FOLLOWUP:[1 week]],PROVIDER:[TOKEN:[[id number]],FOLLOWUP:[1 week]]

## 2021-05-26 NOTE — DISCHARGE NOTE PROVIDER - INSTRUCTIONS
A Mediterranean Diet is recommended! Some suggestions include continue incorporating 2 or more servings per day of vegetables, fruits, and whole grains. Increase intake of fish and legumes/beans to 2 or more servings per week. Aim to increase intake of healthy fats, such as olive oil and avocados, and have a handful of nuts/seeds most days. Reduce red/processed meat consumption to 2 or fewer times per week  Please continue to follow a heart healthy diet, low in sodium, cholesterol and fats. A Mediterranean Diet is recommended. Continue incorporating 2 or more servings per day of vegetables, fruits, and whole grains. Increase intake of fish and legumes/beans to 2 or more servings per week. Aim to increase intake of healthy fats, such as olive oil and avocados, and have a handful of nuts/seeds most days. Reduce red/processed meat consumption to 2 or fewer times per week.  Please continue to follow a heart healthy diet, low in sodium, cholesterol and fats.

## 2021-05-27 ENCOUNTER — TRANSCRIPTION ENCOUNTER (OUTPATIENT)
Age: 81
End: 2021-05-27

## 2021-05-27 VITALS — TEMPERATURE: 98 F

## 2021-05-27 LAB
ALBUMIN SERPL ELPH-MCNC: 3.8 G/DL — SIGNIFICANT CHANGE UP (ref 3.3–5)
ALP SERPL-CCNC: 140 U/L — HIGH (ref 40–120)
ALT FLD-CCNC: 14 U/L — SIGNIFICANT CHANGE UP (ref 10–45)
ANION GAP SERPL CALC-SCNC: 15 MMOL/L — SIGNIFICANT CHANGE UP (ref 5–17)
AST SERPL-CCNC: 23 U/L — SIGNIFICANT CHANGE UP (ref 10–40)
BASOPHILS # BLD AUTO: 0.02 K/UL — SIGNIFICANT CHANGE UP (ref 0–0.2)
BASOPHILS NFR BLD AUTO: 0.3 % — SIGNIFICANT CHANGE UP (ref 0–2)
BILIRUB SERPL-MCNC: 0.9 MG/DL — SIGNIFICANT CHANGE UP (ref 0.2–1.2)
BUN SERPL-MCNC: 16 MG/DL — SIGNIFICANT CHANGE UP (ref 7–23)
CALCIUM SERPL-MCNC: 9.3 MG/DL — SIGNIFICANT CHANGE UP (ref 8.4–10.5)
CHLORIDE SERPL-SCNC: 103 MMOL/L — SIGNIFICANT CHANGE UP (ref 96–108)
CO2 SERPL-SCNC: 18 MMOL/L — LOW (ref 22–31)
COVID-19 SPIKE DOMAIN AB INTERP: POSITIVE
COVID-19 SPIKE DOMAIN ANTIBODY RESULT: >250 U/ML — HIGH
CREAT SERPL-MCNC: 1 MG/DL — SIGNIFICANT CHANGE UP (ref 0.5–1.3)
EOSINOPHIL # BLD AUTO: 0.16 K/UL — SIGNIFICANT CHANGE UP (ref 0–0.5)
EOSINOPHIL NFR BLD AUTO: 2.5 % — SIGNIFICANT CHANGE UP (ref 0–6)
GLUCOSE SERPL-MCNC: 120 MG/DL — HIGH (ref 70–99)
HCT VFR BLD CALC: 38.6 % — LOW (ref 39–50)
HGB BLD-MCNC: 12.6 G/DL — LOW (ref 13–17)
IMM GRANULOCYTES NFR BLD AUTO: 0.5 % — SIGNIFICANT CHANGE UP (ref 0–1.5)
LYMPHOCYTES # BLD AUTO: 1.55 K/UL — SIGNIFICANT CHANGE UP (ref 1–3.3)
LYMPHOCYTES # BLD AUTO: 24.5 % — SIGNIFICANT CHANGE UP (ref 13–44)
MAGNESIUM SERPL-MCNC: 1.9 MG/DL — SIGNIFICANT CHANGE UP (ref 1.6–2.6)
MCHC RBC-ENTMCNC: 29 PG — SIGNIFICANT CHANGE UP (ref 27–34)
MCHC RBC-ENTMCNC: 32.6 GM/DL — SIGNIFICANT CHANGE UP (ref 32–36)
MCV RBC AUTO: 88.9 FL — SIGNIFICANT CHANGE UP (ref 80–100)
MONOCYTES # BLD AUTO: 0.55 K/UL — SIGNIFICANT CHANGE UP (ref 0–0.9)
MONOCYTES NFR BLD AUTO: 8.7 % — SIGNIFICANT CHANGE UP (ref 2–14)
NEUTROPHILS # BLD AUTO: 4.02 K/UL — SIGNIFICANT CHANGE UP (ref 1.8–7.4)
NEUTROPHILS NFR BLD AUTO: 63.5 % — SIGNIFICANT CHANGE UP (ref 43–77)
NRBC # BLD: 0 /100 WBCS — SIGNIFICANT CHANGE UP (ref 0–0)
PLATELET # BLD AUTO: 294 K/UL — SIGNIFICANT CHANGE UP (ref 150–400)
POTASSIUM SERPL-MCNC: 4 MMOL/L — SIGNIFICANT CHANGE UP (ref 3.5–5.3)
POTASSIUM SERPL-SCNC: 4 MMOL/L — SIGNIFICANT CHANGE UP (ref 3.5–5.3)
PROT SERPL-MCNC: 7 G/DL — SIGNIFICANT CHANGE UP (ref 6–8.3)
RBC # BLD: 4.34 M/UL — SIGNIFICANT CHANGE UP (ref 4.2–5.8)
RBC # FLD: 13.2 % — SIGNIFICANT CHANGE UP (ref 10.3–14.5)
SARS-COV-2 IGG+IGM SERPL QL IA: >250 U/ML — HIGH
SARS-COV-2 IGG+IGM SERPL QL IA: POSITIVE
SODIUM SERPL-SCNC: 136 MMOL/L — SIGNIFICANT CHANGE UP (ref 135–145)
WBC # BLD: 6.33 K/UL — SIGNIFICANT CHANGE UP (ref 3.8–10.5)
WBC # FLD AUTO: 6.33 K/UL — SIGNIFICANT CHANGE UP (ref 3.8–10.5)

## 2021-05-27 PROCEDURE — C1885: CPT

## 2021-05-27 PROCEDURE — 36415 COLL VENOUS BLD VENIPUNCTURE: CPT

## 2021-05-27 PROCEDURE — C1894: CPT

## 2021-05-27 PROCEDURE — C1887: CPT

## 2021-05-27 PROCEDURE — 80061 LIPID PANEL: CPT

## 2021-05-27 PROCEDURE — 80053 COMPREHEN METABOLIC PANEL: CPT

## 2021-05-27 PROCEDURE — C2623: CPT

## 2021-05-27 PROCEDURE — 86769 SARS-COV-2 COVID-19 ANTIBODY: CPT

## 2021-05-27 PROCEDURE — 83735 ASSAY OF MAGNESIUM: CPT

## 2021-05-27 PROCEDURE — 82248 BILIRUBIN DIRECT: CPT

## 2021-05-27 PROCEDURE — 99239 HOSP IP/OBS DSCHRG MGMT >30: CPT

## 2021-05-27 PROCEDURE — 85730 THROMBOPLASTIN TIME PARTIAL: CPT

## 2021-05-27 PROCEDURE — C1725: CPT

## 2021-05-27 PROCEDURE — 93005 ELECTROCARDIOGRAM TRACING: CPT

## 2021-05-27 PROCEDURE — 83036 HEMOGLOBIN GLYCOSYLATED A1C: CPT

## 2021-05-27 PROCEDURE — C1769: CPT

## 2021-05-27 PROCEDURE — 85025 COMPLETE CBC W/AUTO DIFF WBC: CPT

## 2021-05-27 PROCEDURE — C1760: CPT

## 2021-05-27 PROCEDURE — 85610 PROTHROMBIN TIME: CPT

## 2021-05-27 RX ORDER — APIXABAN 2.5 MG/1
1 TABLET, FILM COATED ORAL
Qty: 0 | Refills: 0 | DISCHARGE

## 2021-05-27 RX ORDER — CLOPIDOGREL BISULFATE 75 MG/1
1 TABLET, FILM COATED ORAL
Qty: 30 | Refills: 11
Start: 2021-05-27 | End: 2022-05-21

## 2021-05-27 RX ORDER — METOPROLOL TARTRATE 50 MG
1 TABLET ORAL
Qty: 0 | Refills: 0 | DISCHARGE

## 2021-05-27 RX ORDER — ASPIRIN/CALCIUM CARB/MAGNESIUM 324 MG
1 TABLET ORAL
Qty: 6 | Refills: 0
Start: 2021-05-27 | End: 2021-06-01

## 2021-05-27 RX ORDER — GEMFIBROZIL 600 MG
1 TABLET ORAL
Qty: 60 | Refills: 3
Start: 2021-05-27 | End: 2021-09-23

## 2021-05-27 RX ORDER — AMLODIPINE BESYLATE 2.5 MG/1
1 TABLET ORAL
Qty: 30 | Refills: 3
Start: 2021-05-27 | End: 2021-09-23

## 2021-05-27 RX ORDER — GEMFIBROZIL 600 MG
1 TABLET ORAL
Qty: 0 | Refills: 0 | DISCHARGE

## 2021-05-27 RX ORDER — METOPROLOL TARTRATE 50 MG
1 TABLET ORAL
Qty: 30 | Refills: 3
Start: 2021-05-27 | End: 2021-09-23

## 2021-05-27 RX ORDER — ATORVASTATIN CALCIUM 80 MG/1
1 TABLET, FILM COATED ORAL
Qty: 30 | Refills: 3
Start: 2021-05-27 | End: 2021-09-23

## 2021-05-27 RX ORDER — AMLODIPINE BESYLATE 2.5 MG/1
1 TABLET ORAL
Qty: 0 | Refills: 0 | DISCHARGE

## 2021-05-27 RX ORDER — APIXABAN 2.5 MG/1
1 TABLET, FILM COATED ORAL
Qty: 60 | Refills: 3
Start: 2021-05-27 | End: 2021-09-23

## 2021-05-27 RX ADMIN — PANTOPRAZOLE SODIUM 40 MILLIGRAM(S): 20 TABLET, DELAYED RELEASE ORAL at 06:13

## 2021-05-27 RX ADMIN — Medication 1000 UNIT(S): at 10:44

## 2021-05-27 RX ADMIN — Medication 81 MILLIGRAM(S): at 10:44

## 2021-05-27 RX ADMIN — Medication 600 MILLIGRAM(S): at 06:13

## 2021-05-27 RX ADMIN — APIXABAN 5 MILLIGRAM(S): 2.5 TABLET, FILM COATED ORAL at 06:58

## 2021-05-27 RX ADMIN — Medication 1 MILLIGRAM(S): at 10:44

## 2021-05-27 RX ADMIN — CLOPIDOGREL BISULFATE 75 MILLIGRAM(S): 75 TABLET, FILM COATED ORAL at 10:43

## 2021-05-27 RX ADMIN — AMLODIPINE BESYLATE 10 MILLIGRAM(S): 2.5 TABLET ORAL at 06:19

## 2021-05-27 RX ADMIN — Medication 25 MILLIGRAM(S): at 06:13

## 2021-05-27 NOTE — DISCHARGE NOTE NURSING/CASE MANAGEMENT/SOCIAL WORK - PATIENT PORTAL LINK FT
You can access the FollowMyHealth Patient Portal offered by [facility] by registering at the following website: [email or web address]. By joining [email or web address]’s FollowMyHealth portal, you will also be able to view your health information using other applications (apps) compatible with our system.

## 2021-06-01 PROBLEM — I50.32 CHRONIC DIASTOLIC (CONGESTIVE) HEART FAILURE: Chronic | Status: ACTIVE | Noted: 2021-05-24

## 2021-06-01 PROBLEM — D50.9 IRON DEFICIENCY ANEMIA, UNSPECIFIED: Chronic | Status: ACTIVE | Noted: 2021-05-24

## 2021-06-01 PROBLEM — I10 ESSENTIAL (PRIMARY) HYPERTENSION: Chronic | Status: ACTIVE | Noted: 2021-05-24

## 2021-06-01 PROBLEM — R09.89 OTHER SPECIFIED SYMPTOMS AND SIGNS INVOLVING THE CIRCULATORY AND RESPIRATORY SYSTEMS: Chronic | Status: ACTIVE | Noted: 2021-05-24

## 2021-06-01 PROBLEM — J44.9 CHRONIC OBSTRUCTIVE PULMONARY DISEASE, UNSPECIFIED: Chronic | Status: ACTIVE | Noted: 2021-05-24

## 2021-06-01 PROBLEM — E11.9 TYPE 2 DIABETES MELLITUS WITHOUT COMPLICATIONS: Chronic | Status: ACTIVE | Noted: 2021-05-24

## 2021-06-01 PROBLEM — E78.5 HYPERLIPIDEMIA, UNSPECIFIED: Chronic | Status: ACTIVE | Noted: 2021-05-24

## 2021-06-01 PROBLEM — I48.20 CHRONIC ATRIAL FIBRILLATION, UNSPECIFIED: Chronic | Status: ACTIVE | Noted: 2021-05-24

## 2021-06-18 ENCOUNTER — APPOINTMENT (OUTPATIENT)
Dept: HEART AND VASCULAR | Facility: CLINIC | Age: 81
End: 2021-06-18
Payer: MEDICARE

## 2021-06-18 VITALS
HEART RATE: 97 BPM | BODY MASS INDEX: 29.44 KG/M2 | SYSTOLIC BLOOD PRESSURE: 130 MMHG | WEIGHT: 160 LBS | HEIGHT: 62 IN | DIASTOLIC BLOOD PRESSURE: 80 MMHG

## 2021-06-18 PROCEDURE — 99072 ADDL SUPL MATRL&STAF TM PHE: CPT

## 2021-06-18 PROCEDURE — 99213 OFFICE O/P EST LOW 20 MIN: CPT

## 2021-06-24 NOTE — PHYSICAL EXAM
[Well Developed] : well developed [Well Nourished] : well nourished [No Acute Distress] : no acute distress [Normal Conjunctiva] : normal conjunctiva [Normal Venous Pressure] : normal venous pressure [No Carotid Bruit] : no carotid bruit [Normal S1, S2] : normal S1, S2 [No Murmur] : no murmur [No Rub] : no rub [No Gallop] : no gallop [Clear Lung Fields] : clear lung fields [Good Air Entry] : good air entry [No Respiratory Distress] : no respiratory distress  [Soft] : abdomen soft [Non Tender] : non-tender [No Masses/organomegaly] : no masses/organomegaly [Normal Bowel Sounds] : normal bowel sounds [Normal Gait] : normal gait [No Edema] : no edema [No Cyanosis] : no cyanosis [No Clubbing] : no clubbing [No Varicosities] : no varicosities [Diminished Pedal Pulses ___] : diminished pedal pulses [unfilled] [No Rash] : no rash [No Skin Lesions] : no skin lesions [Moves all extremities] : moves all extremities [No Focal Deficits] : no focal deficits [Normal Speech] : normal speech [Alert and Oriented] : alert and oriented [Normal memory] : normal memory

## 2021-06-24 NOTE — REASON FOR VISIT
[FreeTextEntry1] : 80-year-old male with a past medical history of hypertension, hyperlipidemia, atrial fibrillation on Eliquis, PVD and AAA who recently underwent PTA and who presents for post of follow up. He recently underwent a Lpop and SFA DCB/atherectomy. He reports improvement of symptoms on the left side but still has symptoms on the right side.He reports intermittent claudication with severe cramping and pain in his legs (right>left) with <1-2 blocks. He has pain in his hip and thigh. He denies any ulceration in his feet or pain at rest. He is supposed to see Dr. Bradford for his stable asymptomatic AAA, but he doesn’t want to go in to the city for follow up. \par

## 2021-06-24 NOTE — ASSESSMENT
[FreeTextEntry1] : \par [place] Grade II Cat 3 symptoms of severe life style limiting claudication despite medical therapy (R>L)\par He has diffuse moderate to severe SFA disease on the right side and I would like to go ahead an schedule him for PTA/peripheral angio. \par Continue Eliquis and Plavix.\par Aggressive risk factor modification\par He will follow up with Vascular Surgery for AAA\par Discussed with patient's wife and daughter (Poonam Del Valle- - [phone]).

## 2021-06-24 NOTE — REVIEW OF SYSTEMS
[Muscle Cramps] : muscle cramps [Negative] : Heme/Lymph [Joint Pain] : no joint pain [Joint Swelling] : no joint swelling [Joint Stiffness] : no joint stiffness [Myalgia] : no myalgia

## 2021-07-09 ENCOUNTER — APPOINTMENT (OUTPATIENT)
Dept: VASCULAR SURGERY | Facility: CLINIC | Age: 81
End: 2021-07-09
Payer: MEDICARE

## 2021-07-09 PROCEDURE — 99203 OFFICE O/P NEW LOW 30 MIN: CPT

## 2021-07-09 PROCEDURE — 99072 ADDL SUPL MATRL&STAF TM PHE: CPT

## 2021-07-09 NOTE — PHYSICAL EXAM
[2+] : left 2+ [0] : right 0 [1+] : left 1+ [Ankle Swelling (On Exam)] : not present [de-identified] : well appearing, NAD

## 2021-07-09 NOTE — DATA REVIEWED
[FreeTextEntry1] : Ultrasounds from april and july reviewed with noted growth of AAA from 4.3 to 4.9cm

## 2021-07-09 NOTE — ASSESSMENT
[FreeTextEntry1] : 80 y oM with growing AAA (4.3-->4.9cm) on ultrasound.\par \par 1) Recommend CTA chest abdomen pelvis to better evaluate aneurysm anatomy and size\par 2) Discussed open and endovascular options to patient and family, if anatomically feasible for EVAR they would prefer endovascular approach. Would recommend repair if CT confirms ultrasound findings of growth. \par 3) patient will need cardiac clearance prior to surgery. \par

## 2021-07-12 DIAGNOSIS — Z71.89 OTHER SPECIFIED COUNSELING: ICD-10-CM

## 2021-07-16 ENCOUNTER — APPOINTMENT (OUTPATIENT)
Dept: HEART AND VASCULAR | Facility: CLINIC | Age: 81
End: 2021-07-16
Payer: MEDICARE

## 2021-07-16 VITALS
WEIGHT: 160 LBS | RESPIRATION RATE: 12 BRPM | BODY MASS INDEX: 29.44 KG/M2 | DIASTOLIC BLOOD PRESSURE: 80 MMHG | SYSTOLIC BLOOD PRESSURE: 140 MMHG | HEIGHT: 62 IN

## 2021-07-16 VITALS — DIASTOLIC BLOOD PRESSURE: 82 MMHG | SYSTOLIC BLOOD PRESSURE: 135 MMHG

## 2021-07-16 DIAGNOSIS — I71.4 ABDOMINAL AORTIC ANEURYSM, W/OUT RUPTURE: ICD-10-CM

## 2021-07-16 PROCEDURE — 99214 OFFICE O/P EST MOD 30 MIN: CPT

## 2021-08-01 PROBLEM — I71.4 ANEURYSM OF ABDOMINAL AORTA: Status: ACTIVE | Noted: 2021-06-24

## 2021-08-01 NOTE — REASON FOR VISIT
[FreeTextEntry1] : 81 yo M with PMH HTn, smoking, interstitial lung disease, PVD s/p LLE stents, who was found on CT to have a AAA in April measuring 4.4cm, here for routine follow up. Repeat ultrasound shows aneurysm size to be 4.9cm, and he is seen by Dr. Henderson for the same. He has a CTA pending to evaluate the sizing. He denies any abdominal or back pain. He does complain of RLE 1 block claudication that has slowly been improving but he is still life style limiting. \par \par \par  \par

## 2021-08-01 NOTE — ASSESSMENT
[FreeTextEntry1] : We will go ahead and follow up on the results of CTA and he will discuss with Dr. Henderson about the next best steps and timing of AAA repair and best approach. If the aneurysm is stable in size I will go ahead and do a peripheral angiogram to treat his RSFA disease given significant life style limiting symptoms from the same. Continue eliquis 5mg BID.\par RTC in 2 months.

## 2021-08-01 NOTE — REVIEW OF SYSTEMS
[Negative] : Heme/Lymph [Leg Claudication] : intermittent leg claudication [FreeTextEntry5] : R>L lower extremity claudication

## 2021-10-08 ENCOUNTER — APPOINTMENT (OUTPATIENT)
Dept: HEART AND VASCULAR | Facility: CLINIC | Age: 81
End: 2021-10-08
Payer: MEDICARE

## 2021-10-08 VITALS
BODY MASS INDEX: 29.81 KG/M2 | DIASTOLIC BLOOD PRESSURE: 80 MMHG | WEIGHT: 162 LBS | SYSTOLIC BLOOD PRESSURE: 130 MMHG | RESPIRATION RATE: 12 BRPM | HEIGHT: 62 IN

## 2021-10-08 DIAGNOSIS — I73.9 PERIPHERAL VASCULAR DISEASE, UNSPECIFIED: ICD-10-CM

## 2021-10-08 PROCEDURE — 99213 OFFICE O/P EST LOW 20 MIN: CPT

## 2021-10-08 NOTE — REASON FOR VISIT
[FreeTextEntry1] : 81 yo M with PMH HTn, smoking, interstitial lung disease, PVD s/p LLE stents, who was found on CT to have a AAA in April measuring 4.4cm, here for routine follow up. Repeat ultrasound shows aneurysm size to be 4.9cm, and he is seen by Dr. Henderson for the same, he had a CTA that showed that the size of his aneurysm was unchaged and stable at 4.5cm. He denies any abdominal or back pain. He does complain of RLE 1 block claudication that is severe and he can hardly do anything without severe limiting pain.

## 2021-10-08 NOTE — ASSESSMENT
[FreeTextEntry1] : Severe life style limiting claudication of right lower extremity/Grade 1 category 3 Independence Class:\par Plan for peripheral angiogram with PTA, given severe fem pop disease.\par Discussed with patient about the risks and benefits of the procedure and he would like to proceed.\par Discontinue Eliquis one day before procedure. \par Continue plavix.\par RTC in 3 months.

## 2021-10-15 VITALS
HEART RATE: 88 BPM | OXYGEN SATURATION: 100 % | SYSTOLIC BLOOD PRESSURE: 140 MMHG | RESPIRATION RATE: 18 BRPM | DIASTOLIC BLOOD PRESSURE: 95 MMHG | TEMPERATURE: 98 F

## 2021-10-15 RX ORDER — CHLORHEXIDINE GLUCONATE 213 G/1000ML
1 SOLUTION TOPICAL ONCE
Refills: 0 | Status: DISCONTINUED | OUTPATIENT
Start: 2021-10-20 | End: 2021-10-20

## 2021-10-15 NOTE — H&P ADULT - NSHPSOCIALHISTORY_GEN_ALL_CORE
etoh:  smoking:   drugs: etoh: rare etoh   smoking: former somker quit 45 yrs ago  drugs: none etoh: rare etoh   smoking: former smoker quit 45 yrs ago  drugs: none

## 2021-10-15 NOTE — H&P ADULT - HISTORY OF PRESENT ILLNESS
SKELE[place]    Cardiologist: Dr. Zambrano   COVID:   Escort:   Pharmacy: in [facility]     confirm medications    Patient is a 81yo Faroese Speaking Male w/ PMHx HTN, HLD, A-Fib (on Eliquis - last dose:     ), chronic HFpEF (EF:    as per ECHO), PAD (05/2021 - Laser/DCB to L Popliteal (90-95%) and laser/PTA to L AT(90-95%), known 4.5 cm infrarenal abdominal aortic aneurysm with mural thrombus, no surgical intervention required as of 05/2021 followed up with Dr. Shepherd - ____, COPD (denies hospitalizations/intubations?), GERD, and BLANQUITA (Hgb: 12.4 on 05/2021), who initially presented to his cardiologist Dr. Zambrano c/o intermittent claudication w/ severe LE cramping (L>R), upon ambulation of 1-2 blocks, and relieved w/ rest. Pt also admits to occasional pain at night, located in the hip and thighs. He denies any LE edema, LE erythema, unhealed ulcers, change in skin texture/loss of hair, fevers, chills, sick contacts, CP, SOB, palpitations, orthopnea, lightheadedness Patient underwent Arterial Duplex : severe Left iliac disease >75% stenosis of the left popliteal artery, and moderate-severe right CFA disease. 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     In light of patient's risk factors, [place] Class ___ sx, and known ___ disease, patient presents for peripheral angiopasty with possible intervention if clinically indicated.    Cardiologist: Dr. Zambrano   COVID: 10/18 [facility] in [facility], daughter will bring results  Escort: daughter  Pharmacy: in [facility]     confirm medications on arrival     Poonam Hidalgo and HCP     Patient is a 79yo Montenegrin Speaking Male w/ PMHx HTN, HLD, A-Fib (on Eliquis - last dose 10/17 AM), chronic HFpEF (EF: 55% as per ECHO11/2020), PAD (05/2021 - Laser/DCB to L Popliteal (90-95%) and laser/PTA to L AT(90-95%), known 4.5 cm infrarenal abdominal aortic aneurysm with mural thrombus, no surgical intervention required as of 05/2021 followed up with Dr. Shepherd) COPD denies intubation/ hospitalizations), GERD, and BLANQUITA (Hgb: 12.4 on 05/2021), who initially presented to his cardiologist Dr. Zambrano endorsing BL LE throbbing type pain with walking <1 block. Patient was recently for periperial angiogram gram w/ L sided intervention that impfoved his symtoms for approx 1-2 months, then symptoms returned. The pain extends the entire length of the legs. Patient denies open area, nonhealing wounds, legs feeling cool to the touch or hair loss. CTA Abd 7/12/21 peripheral arterial disease, Infrarenal abdominal aorta 4.5 cm , LCIA distal aneurysmal enlargement to 2.0 cm, PAMELA 1.0 cm penetrating ulcer associated with 40-50% stenosis, REIA 30-40% stenosis.  In light of patient's risk factors, [place] Class III sx, and abnormal CTA patient presents for peripheral angioplasty with possible intervention if clinically indicated.    Cardiologist: Dr. Zambrano   COVID: 10/18 [facility] in [facility], daughter will bring results  Escort: daughter  Pharmacy: in [facility]     Language Line Solution ID# [id number] (Rafaela)  Meds confirmed on arrival.  81 y/o Japanese Speaking Male w/ PMHx HTN, HLD, A-Fib (on Eliquis - last dose 10/19/21PM), chronic HFpEF (EF: 55% as per ECHO11/2020), PAD (05/2021 - Laser/DCB to L Popliteal (90-95%) and laser/PTA to L AT(90-95%), known 4.5 cm infrarenal abdominal aortic aneurysm with mural thrombus, no surgical intervention required as of 05/2021 followed up with Dr. Shepherd) COPD denies intubation/ hospitalizations), GERD, and BLANQUITA (Hgb: 12.4 on 05/2021), who initially presented to his cardiologist Dr. Zambrano endorsing BL LE throbbing type pain with walking <1 block. Patient was recently for peripheral angiogram gram w/ L sided intervention that improved his symptoms for approx 1-2 months, then symptoms returned. The pain extends the entire length of the legs. Patient denies open area, nonhealing wounds, legs feeling cool to the touch or hair loss. CTA Abd 7/12/21 peripheral arterial disease, Infrarenal abdominal aorta 4.5 cm , LCIA distal aneurysmal enlargement to 2.0 cm, PAMELA 1.0 cm penetrating ulcer associated with 40-50% stenosis, REIA 30-40% stenosis.  In light of patient's risk factors, [place] Class III sx, and abnormal CTA patient presents for peripheral angioplasty with possible intervention if clinically indicated.

## 2021-10-15 NOTE — H&P ADULT - NSHPLABSRESULTS_GEN_ALL_CORE
11.3   6.25  )-----------( 285      ( 20 Oct 2021 13:24 )             37.4   10-20    143  |  109<H>  |  20  ----------------------------<  114<H>  4.1   |  24  |  0.96    Ca    9.6      20 Oct 2021 13:25    TPro  7.2  /  Alb  4.6  /  TBili  0.5  /  DBili  x   /  AST  17  /  ALT  13  /  AlkPhos  128<H>  10-20  PT/INR - ( 20 Oct 2021 13:24 )   PT: 12.6 sec;   INR: 1.05          PTT - ( 20 Oct 2021 13:24 )  PTT:29.1 sec

## 2021-10-15 NOTE — H&P ADULT - ASSESSMENT
79 y/o Azeri Speaking Male w/ PMHx HTN, HLD, A-Fib (on Eliquis - last dose 10/19/21PM), chronic HFpEF (EF: 55% as per ECHO11/2020), PAD (05/2021 - Laser/DCB to L Popliteal (90-95%) and laser/PTA to L AT(90-95%), known 4.5 cm infrarenal abdominal aortic aneurysm with mural thrombus, no surgical intervention required as of 05/2021 followed up with Dr. Shepherd) COPD denies intubation/ hospitalizations), GERD, and BLANQUITA (Hgb: 12.4 on 05/2021), who initially presented to his cardiologist Dr. Zamrbano endorsing BL LE throbbing type pain with walking <1 block. In light of patient's risk factors, [place] Class III sx, and abnormal CTA patient presents for peripheral angioplasty with possible intervention if clinically indicated.    EKG: Afib @79bpm w/o ischemic changes  ASA 3			Mallampati class: 2         D[place] class IIb    -No Known Allergies  -H/H = 11.3/37.4. Pt denies BRBPR, hematuria, hematochezia, melena. Pt reports good compliance w/ home Eliquis 5mg BID and Plavix 75mg, stating his last dose of both was yesterday night 10/19/21. Dr. Zambrano made aware and okay'd to proceed with the case. Pt loaded w/ ASA 325mg x1 and Plavix 75mg x1.  -BUN/Cr = 20/0.96. EF 55%. Euvolemic on exam. IV NS @ 75cc/hr x 4hrs started pre procedure.    Sedation Plan:   Moderate  Patient Is Suitable Candidate For Sedation?     Yes    Consented w/ assistance of Language Line Solution ID# [id number] (Rafaela)  Risks & benefits of procedure and alternative therapy have been explained to the patient including but not limited to: allergic reaction, bleeding with possible need for blood transfusion, infection, renal and vascular compromise, limb damage, arrhythmia, stroke, vessel dissection/perforation, myocardial infarction, and emergent CABG. Informed consent obtained at bedside and included in chart.

## 2021-10-20 ENCOUNTER — INPATIENT (INPATIENT)
Facility: HOSPITAL | Age: 81
LOS: 0 days | Discharge: HOME | DRG: 300 | End: 2021-10-21
Attending: INTERNAL MEDICINE | Admitting: INTERNAL MEDICINE
Payer: MEDICARE

## 2021-10-20 DIAGNOSIS — Z98.890 OTHER SPECIFIED POSTPROCEDURAL STATES: Chronic | ICD-10-CM

## 2021-10-20 DIAGNOSIS — Z90.49 ACQUIRED ABSENCE OF OTHER SPECIFIED PARTS OF DIGESTIVE TRACT: Chronic | ICD-10-CM

## 2021-10-20 LAB
A1C WITH ESTIMATED AVERAGE GLUCOSE RESULT: 5.5 % — SIGNIFICANT CHANGE UP (ref 4–5.6)
ALBUMIN SERPL ELPH-MCNC: 4.6 G/DL — SIGNIFICANT CHANGE UP (ref 3.3–5)
ALP SERPL-CCNC: 128 U/L — HIGH (ref 40–120)
ALT FLD-CCNC: 13 U/L — SIGNIFICANT CHANGE UP (ref 10–45)
ANION GAP SERPL CALC-SCNC: 10 MMOL/L — SIGNIFICANT CHANGE UP (ref 5–17)
APTT BLD: 29.1 SEC — SIGNIFICANT CHANGE UP (ref 27.5–35.5)
AST SERPL-CCNC: 17 U/L — SIGNIFICANT CHANGE UP (ref 10–40)
BASOPHILS # BLD AUTO: 0.03 K/UL — SIGNIFICANT CHANGE UP (ref 0–0.2)
BASOPHILS NFR BLD AUTO: 0.5 % — SIGNIFICANT CHANGE UP (ref 0–2)
BILIRUB SERPL-MCNC: 0.5 MG/DL — SIGNIFICANT CHANGE UP (ref 0.2–1.2)
BUN SERPL-MCNC: 20 MG/DL — SIGNIFICANT CHANGE UP (ref 7–23)
CALCIUM SERPL-MCNC: 9.6 MG/DL — SIGNIFICANT CHANGE UP (ref 8.4–10.5)
CHLORIDE SERPL-SCNC: 109 MMOL/L — HIGH (ref 96–108)
CHOLEST SERPL-MCNC: 124 MG/DL — SIGNIFICANT CHANGE UP
CK MB CFR SERPL CALC: 1.4 NG/ML — SIGNIFICANT CHANGE UP (ref 0–6.7)
CK SERPL-CCNC: 57 U/L — SIGNIFICANT CHANGE UP (ref 30–200)
CO2 SERPL-SCNC: 24 MMOL/L — SIGNIFICANT CHANGE UP (ref 22–31)
CREAT SERPL-MCNC: 0.96 MG/DL — SIGNIFICANT CHANGE UP (ref 0.5–1.3)
EOSINOPHIL # BLD AUTO: 0.35 K/UL — SIGNIFICANT CHANGE UP (ref 0–0.5)
EOSINOPHIL NFR BLD AUTO: 5.6 % — SIGNIFICANT CHANGE UP (ref 0–6)
ESTIMATED AVERAGE GLUCOSE: 111 MG/DL — SIGNIFICANT CHANGE UP (ref 68–114)
GLUCOSE SERPL-MCNC: 114 MG/DL — HIGH (ref 70–99)
HCT VFR BLD CALC: 37.4 % — LOW (ref 39–50)
HDLC SERPL-MCNC: 31 MG/DL — LOW
HGB BLD-MCNC: 11.3 G/DL — LOW (ref 13–17)
IMM GRANULOCYTES NFR BLD AUTO: 0.3 % — SIGNIFICANT CHANGE UP (ref 0–1.5)
INR BLD: 1.05 — SIGNIFICANT CHANGE UP (ref 0.88–1.16)
LIPID PNL WITH DIRECT LDL SERPL: 53 MG/DL — SIGNIFICANT CHANGE UP
LYMPHOCYTES # BLD AUTO: 1.46 K/UL — SIGNIFICANT CHANGE UP (ref 1–3.3)
LYMPHOCYTES # BLD AUTO: 23.4 % — SIGNIFICANT CHANGE UP (ref 13–44)
MCHC RBC-ENTMCNC: 25.6 PG — LOW (ref 27–34)
MCHC RBC-ENTMCNC: 30.2 GM/DL — LOW (ref 32–36)
MCV RBC AUTO: 84.6 FL — SIGNIFICANT CHANGE UP (ref 80–100)
MONOCYTES # BLD AUTO: 0.37 K/UL — SIGNIFICANT CHANGE UP (ref 0–0.9)
MONOCYTES NFR BLD AUTO: 5.9 % — SIGNIFICANT CHANGE UP (ref 2–14)
NEUTROPHILS # BLD AUTO: 4.02 K/UL — SIGNIFICANT CHANGE UP (ref 1.8–7.4)
NEUTROPHILS NFR BLD AUTO: 64.3 % — SIGNIFICANT CHANGE UP (ref 43–77)
NON HDL CHOLESTEROL: 93 MG/DL — SIGNIFICANT CHANGE UP
NRBC # BLD: 0 /100 WBCS — SIGNIFICANT CHANGE UP (ref 0–0)
PLATELET # BLD AUTO: 285 K/UL — SIGNIFICANT CHANGE UP (ref 150–400)
POTASSIUM SERPL-MCNC: 4.1 MMOL/L — SIGNIFICANT CHANGE UP (ref 3.5–5.3)
POTASSIUM SERPL-SCNC: 4.1 MMOL/L — SIGNIFICANT CHANGE UP (ref 3.5–5.3)
PROT SERPL-MCNC: 7.2 G/DL — SIGNIFICANT CHANGE UP (ref 6–8.3)
PROTHROM AB SERPL-ACNC: 12.6 SEC — SIGNIFICANT CHANGE UP (ref 10.6–13.6)
RBC # BLD: 4.42 M/UL — SIGNIFICANT CHANGE UP (ref 4.2–5.8)
RBC # FLD: 14.7 % — HIGH (ref 10.3–14.5)
SODIUM SERPL-SCNC: 143 MMOL/L — SIGNIFICANT CHANGE UP (ref 135–145)
TRIGL SERPL-MCNC: 199 MG/DL — HIGH
WBC # BLD: 6.25 K/UL — SIGNIFICANT CHANGE UP (ref 3.8–10.5)
WBC # FLD AUTO: 6.25 K/UL — SIGNIFICANT CHANGE UP (ref 3.8–10.5)

## 2021-10-20 PROCEDURE — 36246 INS CATH ABD/L-EXT ART 2ND: CPT

## 2021-10-20 PROCEDURE — 93010 ELECTROCARDIOGRAM REPORT: CPT

## 2021-10-20 PROCEDURE — 75630 X-RAY AORTA LEG ARTERIES: CPT | Mod: 26,52

## 2021-10-20 RX ORDER — ASPIRIN/CALCIUM CARB/MAGNESIUM 324 MG
325 TABLET ORAL ONCE
Refills: 0 | Status: COMPLETED | OUTPATIENT
Start: 2021-10-20 | End: 2021-10-20

## 2021-10-20 RX ORDER — ALLOPURINOL 300 MG
1 TABLET ORAL
Qty: 0 | Refills: 0 | DISCHARGE

## 2021-10-20 RX ORDER — FOLIC ACID 0.8 MG
1 TABLET ORAL
Qty: 0 | Refills: 0 | DISCHARGE

## 2021-10-20 RX ORDER — ATORVASTATIN CALCIUM 80 MG/1
40 TABLET, FILM COATED ORAL AT BEDTIME
Refills: 0 | Status: DISCONTINUED | OUTPATIENT
Start: 2021-10-20 | End: 2021-10-21

## 2021-10-20 RX ORDER — CLOPIDOGREL BISULFATE 75 MG/1
75 TABLET, FILM COATED ORAL DAILY
Refills: 0 | Status: DISCONTINUED | OUTPATIENT
Start: 2021-10-20 | End: 2021-10-21

## 2021-10-20 RX ORDER — AMLODIPINE BESYLATE 2.5 MG/1
10 TABLET ORAL DAILY
Refills: 0 | Status: DISCONTINUED | OUTPATIENT
Start: 2021-10-20 | End: 2021-10-21

## 2021-10-20 RX ORDER — LINACLOTIDE 145 UG/1
1 CAPSULE, GELATIN COATED ORAL
Qty: 0 | Refills: 0 | DISCHARGE

## 2021-10-20 RX ORDER — SODIUM CHLORIDE 9 MG/ML
500 INJECTION INTRAMUSCULAR; INTRAVENOUS; SUBCUTANEOUS
Refills: 0 | Status: COMPLETED | OUTPATIENT
Start: 2021-10-20 | End: 2021-10-20

## 2021-10-20 RX ORDER — CLOPIDOGREL BISULFATE 75 MG/1
75 TABLET, FILM COATED ORAL ONCE
Refills: 0 | Status: COMPLETED | OUTPATIENT
Start: 2021-10-20 | End: 2021-10-20

## 2021-10-20 RX ORDER — PANTOPRAZOLE SODIUM 20 MG/1
40 TABLET, DELAYED RELEASE ORAL ONCE
Refills: 0 | Status: DISCONTINUED | OUTPATIENT
Start: 2021-10-20 | End: 2021-10-21

## 2021-10-20 RX ORDER — CHOLECALCIFEROL (VITAMIN D3) 125 MCG
1000 CAPSULE ORAL DAILY
Refills: 0 | Status: DISCONTINUED | OUTPATIENT
Start: 2021-10-20 | End: 2021-10-21

## 2021-10-20 RX ORDER — OMEPRAZOLE 10 MG/1
1 CAPSULE, DELAYED RELEASE ORAL
Qty: 0 | Refills: 0 | DISCHARGE

## 2021-10-20 RX ORDER — INFLUENZA VIRUS VACCINE 15; 15; 15; 15 UG/.5ML; UG/.5ML; UG/.5ML; UG/.5ML
0.5 SUSPENSION INTRAMUSCULAR ONCE
Refills: 0 | Status: DISCONTINUED | OUTPATIENT
Start: 2021-10-20 | End: 2021-10-20

## 2021-10-20 RX ORDER — CHOLECALCIFEROL (VITAMIN D3) 125 MCG
1 CAPSULE ORAL
Qty: 0 | Refills: 0 | DISCHARGE

## 2021-10-20 RX ORDER — METOPROLOL TARTRATE 50 MG
25 TABLET ORAL DAILY
Refills: 0 | Status: DISCONTINUED | OUTPATIENT
Start: 2021-10-20 | End: 2021-10-21

## 2021-10-20 RX ORDER — SODIUM CHLORIDE 9 MG/ML
500 INJECTION INTRAMUSCULAR; INTRAVENOUS; SUBCUTANEOUS
Refills: 0 | Status: DISCONTINUED | OUTPATIENT
Start: 2021-10-20 | End: 2021-10-20

## 2021-10-20 RX ORDER — APIXABAN 2.5 MG/1
5 TABLET, FILM COATED ORAL
Refills: 0 | Status: DISCONTINUED | OUTPATIENT
Start: 2021-10-21 | End: 2021-10-21

## 2021-10-20 RX ORDER — INFLUENZA VIRUS VACCINE 15; 15; 15; 15 UG/.5ML; UG/.5ML; UG/.5ML; UG/.5ML
0.7 SUSPENSION INTRAMUSCULAR ONCE
Refills: 0 | Status: DISCONTINUED | OUTPATIENT
Start: 2021-10-20 | End: 2021-10-21

## 2021-10-20 RX ORDER — FOLIC ACID 0.8 MG
1 TABLET ORAL DAILY
Refills: 0 | Status: DISCONTINUED | OUTPATIENT
Start: 2021-10-20 | End: 2021-10-21

## 2021-10-20 RX ORDER — ALLOPURINOL 300 MG
100 TABLET ORAL DAILY
Refills: 0 | Status: DISCONTINUED | OUTPATIENT
Start: 2021-10-20 | End: 2021-10-21

## 2021-10-20 RX ADMIN — Medication 325 MILLIGRAM(S): at 16:43

## 2021-10-20 RX ADMIN — SODIUM CHLORIDE 75 MILLILITER(S): 9 INJECTION INTRAMUSCULAR; INTRAVENOUS; SUBCUTANEOUS at 16:45

## 2021-10-20 RX ADMIN — CLOPIDOGREL BISULFATE 75 MILLIGRAM(S): 75 TABLET, FILM COATED ORAL at 16:43

## 2021-10-20 RX ADMIN — SODIUM CHLORIDE 75 MILLILITER(S): 9 INJECTION INTRAMUSCULAR; INTRAVENOUS; SUBCUTANEOUS at 23:02

## 2021-10-20 RX ADMIN — ATORVASTATIN CALCIUM 40 MILLIGRAM(S): 80 TABLET, FILM COATED ORAL at 22:57

## 2021-10-20 NOTE — PATIENT PROFILE ADULT - NSPROPTRIGHTCAREGIVER_GEN_A_NUR
Medication Management Service  [facility]  [phone]    Visit Date: 2/4/2021   Subjective:       Gio Godwin is a 77 y.o. female who presents to clinic today for anticoagulation monitoring and adjustment. Patient seen in clinic for warfarin management due to  Indication:   atrial fibrillation and TIA. INR goal: of 2.0-3.0. Duration of therapy: indefinite. Patient reports the following:   Adherent with regimen  Missed or extra doses:  None   Bleeding or thromboembolic side effects:  None  Significant medication changes:  None  Significant dietary changes: None  Significant alcohol or tobacco changes: None  Significant recent illness, disease state changes, or hospitalization:  None  Upcoming surgeries or procedures:  Bladder surgery 2/22. Falls: None           Assessment and Plan     PT/INR done in office per protocol. INR today is 2.2, therapeutic. Patient to have bladder surgery 2/22 and surgeon instructed her to hold warfarin for 5 days prior. Cardiac clearance on file authorizes holding warfarin. Plan: Will continue current regimen of warfarin 5mg daily except 2.5mg on Tuesdays. Recheck INR in 4.5 week(s). (2 weeks after surgery) Patient unable to return 1 week after due to other appointments. Patient verbalized understanding of dosing directions and information discussed. Dosing schedule given to patient including phone number, appointment date, and time. Progress note sent to referring office. Patient acknowledges working in consult agreement with pharmacist as referred by his/her physician.       Electronically signed by SANJANA Vance [facility] on 2/4/21 at 2:52 PM EST    CLINICAL PHARMACY CONSULT: MED RECONCILIATION/REVIEW Alda  22. Tracking Only    PHSO: No  Total # of Interventions Recommended: 0    - Maintenance Safety Lab Monitoring #: 1  Total Interventions Accepted: 0  Time Spent (min): Bartolo An PharmD no

## 2021-10-21 ENCOUNTER — TRANSCRIPTION ENCOUNTER (OUTPATIENT)
Age: 81
End: 2021-10-21

## 2021-10-21 VITALS — TEMPERATURE: 97 F

## 2021-10-21 LAB
ANION GAP SERPL CALC-SCNC: 12 MMOL/L — SIGNIFICANT CHANGE UP (ref 5–17)
BUN SERPL-MCNC: 16 MG/DL — SIGNIFICANT CHANGE UP (ref 7–23)
CALCIUM SERPL-MCNC: 9.2 MG/DL — SIGNIFICANT CHANGE UP (ref 8.4–10.5)
CHLORIDE SERPL-SCNC: 104 MMOL/L — SIGNIFICANT CHANGE UP (ref 96–108)
CO2 SERPL-SCNC: 21 MMOL/L — LOW (ref 22–31)
COVID-19 SPIKE DOMAIN AB INTERP: POSITIVE
COVID-19 SPIKE DOMAIN ANTIBODY RESULT: >250 U/ML — HIGH
CREAT SERPL-MCNC: 1 MG/DL — SIGNIFICANT CHANGE UP (ref 0.5–1.3)
GLUCOSE SERPL-MCNC: 94 MG/DL — SIGNIFICANT CHANGE UP (ref 70–99)
HCT VFR BLD CALC: 35.8 % — LOW (ref 39–50)
HGB BLD-MCNC: 11 G/DL — LOW (ref 13–17)
MAGNESIUM SERPL-MCNC: 1.8 MG/DL — SIGNIFICANT CHANGE UP (ref 1.6–2.6)
MCHC RBC-ENTMCNC: 26.2 PG — LOW (ref 27–34)
MCHC RBC-ENTMCNC: 30.7 GM/DL — LOW (ref 32–36)
MCV RBC AUTO: 85.2 FL — SIGNIFICANT CHANGE UP (ref 80–100)
NRBC # BLD: 0 /100 WBCS — SIGNIFICANT CHANGE UP (ref 0–0)
PLATELET # BLD AUTO: 262 K/UL — SIGNIFICANT CHANGE UP (ref 150–400)
POTASSIUM SERPL-MCNC: 4 MMOL/L — SIGNIFICANT CHANGE UP (ref 3.5–5.3)
POTASSIUM SERPL-SCNC: 4 MMOL/L — SIGNIFICANT CHANGE UP (ref 3.5–5.3)
RBC # BLD: 4.2 M/UL — SIGNIFICANT CHANGE UP (ref 4.2–5.8)
RBC # FLD: 14.5 % — SIGNIFICANT CHANGE UP (ref 10.3–14.5)
SARS-COV-2 IGG+IGM SERPL QL IA: >250 U/ML — HIGH
SARS-COV-2 IGG+IGM SERPL QL IA: POSITIVE
SODIUM SERPL-SCNC: 137 MMOL/L — SIGNIFICANT CHANGE UP (ref 135–145)
WBC # BLD: 5.92 K/UL — SIGNIFICANT CHANGE UP (ref 3.8–10.5)
WBC # FLD AUTO: 5.92 K/UL — SIGNIFICANT CHANGE UP (ref 3.8–10.5)

## 2021-10-21 PROCEDURE — 99239 HOSP IP/OBS DSCHRG MGMT >30: CPT

## 2021-10-21 RX ORDER — FOLIC ACID 0.8 MG
1 TABLET ORAL
Qty: 0 | Refills: 0 | DISCHARGE

## 2021-10-21 RX ORDER — PANTOPRAZOLE SODIUM 20 MG/1
40 TABLET, DELAYED RELEASE ORAL
Refills: 0 | Status: DISCONTINUED | OUTPATIENT
Start: 2021-10-21 | End: 2021-10-21

## 2021-10-21 RX ORDER — CHOLECALCIFEROL (VITAMIN D3) 125 MCG
1 CAPSULE ORAL
Qty: 0 | Refills: 0 | DISCHARGE

## 2021-10-21 RX ADMIN — Medication 1 MILLIGRAM(S): at 09:53

## 2021-10-21 RX ADMIN — Medication 25 MILLIGRAM(S): at 06:50

## 2021-10-21 RX ADMIN — PANTOPRAZOLE SODIUM 40 MILLIGRAM(S): 20 TABLET, DELAYED RELEASE ORAL at 06:50

## 2021-10-21 RX ADMIN — APIXABAN 5 MILLIGRAM(S): 2.5 TABLET, FILM COATED ORAL at 06:50

## 2021-10-21 RX ADMIN — AMLODIPINE BESYLATE 10 MILLIGRAM(S): 2.5 TABLET ORAL at 06:50

## 2021-10-21 RX ADMIN — CLOPIDOGREL BISULFATE 75 MILLIGRAM(S): 75 TABLET, FILM COATED ORAL at 09:53

## 2021-10-21 RX ADMIN — Medication 100 MILLIGRAM(S): at 09:52

## 2021-10-21 RX ADMIN — Medication 1000 UNIT(S): at 09:52

## 2021-10-21 NOTE — DISCHARGE NOTE NURSING/CASE MANAGEMENT/SOCIAL WORK - PATIENT PORTAL LINK FT
You can access the FollowMyHealth Patient Portal offered by [facility] by registering at the following website: [email or web address]. By joining [employer]’s FollowMyHealth portal, you will also be able to view your health information using other applications (apps) compatible with our system.

## 2021-10-21 NOTE — DISCHARGE NOTE PROVIDER - NSDCFUADDINST_GEN_ALL_CORE_FT
No heavy lifting, strenuous activity, bending, straining or unnecessary stair climbing  for 2 weeks. No sex for 1 week.  No driving for 2 days. You may shower 24 hours following procedure but avoid baths and swimming for 1 week. Check groin site for bleeding and/or swelling daily following procedure. Call your doctor/cardiologist immediately should it occur or if you have increased/persistent pain at the site. Follow up with your cardiologist in 1- 2 weeks. You may call [facility] Cardiovascular unit at  [phone] after office hours and weekends  with any questions or concerns following your procedure. Take medications as prescribed.

## 2021-10-21 NOTE — DISCHARGE NOTE PROVIDER - CARE PROVIDER_API CALL
Jamila Zambrano)  Cardiology; Internal Medicine; Interventional Cardiology  [address] [address]  Phone: [phone]  Fax: [phone]  Follow Up Time:     Mitzy Henderson)  [facility] Surgery  Vascular  [address] [place] [address]  Phone: [phone]  Fax: [phone]  Follow Up Time:

## 2021-10-21 NOTE — DISCHARGE NOTE PROVIDER - NSDCMRMEDTOKEN_GEN_ALL_CORE_FT
allopurinol 100 mg oral tablet: 1 tab(s) orally 2 times a day, As Needed  amLODIPine 10 mg oral tablet: 1 tab(s) orally once a day  atorvastatin 40 mg oral tablet: 1 tab(s) orally once a day (at bedtime)  clopidogrel 75 mg oral tablet: 1 tab(s) orally once a day  Eliquis 5 mg oral tablet: 1 tab(s) orally 2 times a day  folic acid 1 mg oral tablet: 1 tab(s) orally once a day  gemfibrozil 600 mg oral tablet: 1 tab(s) orally 2 times a day  Linzess 145 mcg oral capsule: 1 cap(s) orally once a day, As Needed  omeprazole 40 mg oral delayed release capsule: 1 cap(s) orally once a day  Toprol-XL 25 mg oral tablet, extended release: 1 tab(s) orally once a day  Vitamin D3 25 mcg (1000 intl units) oral tablet: 1 tab(s) orally once a day

## 2021-10-21 NOTE — DISCHARGE NOTE PROVIDER - NSDCCPCAREPLAN_GEN_ALL_CORE_FT
PRINCIPAL DISCHARGE DIAGNOSIS  Diagnosis: Peripheral arterial disease  Assessment and Plan of Treatment: You have a diagnosis of peripheral arterial disease and underwent a diagnostic peripheral angiogram showing non-obstructive peripheral arterial disease and the stents to your leg are open,   --Please continue daily Plavix so the stent in your leg does not close.   -- DO NOT STOP THESE MEDICATIONS FOR ANY REASON UNLESS OTHERWISE INDICATED BY YOUR CARDIOLOGIST BECAUSE THIS WILL PUT YOU AT RISK FOR A BLOCKAGE IN YOUR STENT.   You should refrain from strenuous activity and heavy lifting for 1 week. Please make a follow up appointment with your cardiologist, Dr. Zambrano within 1-2 weeks of your discharge. All of your prescriptions have been sent electronically to your pharmacy.        SECONDARY DISCHARGE DIAGNOSES  Diagnosis: Chronic atrial fibrillation  Assessment and Plan of Treatment: --Please continue Eliquis 5mg twice daily and Metoprolol Succinate 25mg daily    Diagnosis: HTN (hypertension)  Assessment and Plan of Treatment: You have a history of elevated blood pressure and you should continue your blood pressure medications as prescribed.  --Continue Metoprolol Succinate 25mg daily, Amlodipine 10mg daily      Diagnosis: HLD (hyperlipidemia)  Assessment and Plan of Treatment: Please continue your daily statin to ensure your cardiac stent remains open.  --Please continue Atorvastatin 40mg daily at nights     PRINCIPAL DISCHARGE DIAGNOSIS  Diagnosis: Peripheral arterial disease  Assessment and Plan of Treatment: You have a diagnosis of peripheral arterial disease and underwent a diagnostic peripheral angiogram showing non-obstructive peripheral arterial disease and the stents to your leg are open,   --Please continue daily Plavix so the stent in your leg does not close.   NO ASPIRIN GIVEN AS YOU ARE ALREADY ON ANOTHER BLOOD THINNER KNOWN AS ELIQUIS  -- DO NOT STOP THESE MEDICATIONS FOR ANY REASON UNLESS OTHERWISE INDICATED BY YOUR CARDIOLOGIST BECAUSE THIS WILL PUT YOU AT RISK FOR A BLOCKAGE IN YOUR STENT.   You should refrain from strenuous activity and heavy lifting for 1 week. Please make a follow up appointment with your cardiologist, Dr. Zambrano within 1-2 weeks of your discharge. All of your prescriptions have been sent electronically to your pharmacy.        SECONDARY DISCHARGE DIAGNOSES  Diagnosis: Chronic atrial fibrillation  Assessment and Plan of Treatment: --Please continue Eliquis 5mg twice daily and Metoprolol Succinate 25mg daily    Diagnosis: HTN (hypertension)  Assessment and Plan of Treatment: You have a history of elevated blood pressure and you should continue your blood pressure medications as prescribed.  --Continue Metoprolol Succinate 25mg daily, Amlodipine 10mg daily      Diagnosis: HLD (hyperlipidemia)  Assessment and Plan of Treatment: Please continue your daily statin to ensure your cardiac stent remains open.  --Please continue Atorvastatin 40mg daily at nights     PRINCIPAL DISCHARGE DIAGNOSIS  Diagnosis: Peripheral arterial disease  Assessment and Plan of Treatment: You have a diagnosis of peripheral arterial disease and underwent a diagnostic peripheral angiogram showing non-obstructive peripheral arterial disease and the stents to your leg are open,   --Please continue daily Plavix so the stent in your leg does not close.   NO ASPIRIN GIVEN AS YOU ARE ALREADY ON ANOTHER BLOOD THINNER KNOWN AS ELIQUIS  -- DO NOT STOP THESE MEDICATIONS FOR ANY REASON UNLESS OTHERWISE INDICATED BY YOUR CARDIOLOGIST BECAUSE THIS WILL PUT YOU AT RISK FOR A BLOCKAGE IN YOUR STENT.   You should refrain from strenuous activity and heavy lifting for 1 week. Please make a follow up appointment with your cardiologist, Dr. Zambrano within 1-2 weeks of your discharge. All of your prescriptions have been sent electronically to your pharmacy.        SECONDARY DISCHARGE DIAGNOSES  Diagnosis: Chronic atrial fibrillation  Assessment and Plan of Treatment: --Please continue Eliquis 5mg twice daily and Metoprolol Succinate 25mg daily (no changes to medications)    Diagnosis: HTN (hypertension)  Assessment and Plan of Treatment: You have a history of elevated blood pressure and you should continue your blood pressure medications as prescribed.  --Continue Metoprolol Succinate 25mg daily and  Amlodipine 10mg daily (no changes to medications)      Diagnosis: HLD (hyperlipidemia)  Assessment and Plan of Treatment: Please continue your daily statin to ensure your cardiac stent remains open.  --Please continue Atorvastatin 40mg daily at nights (no changes to medication)

## 2021-10-21 NOTE — DISCHARGE NOTE PROVIDER - HOSPITAL COURSE
79 y/o Arabic Speaking Male w/ PMHx HTN, HLD, A-Fib (on Eliquis - last dose 10/19/21PM), chronic HFpEF (EF: 55% as per ECHO11/2020), PAD (05/2021 - Laser/DCB to L Popliteal (90-95%) and laser/PTA to L AT(90-95%), known 4.5 cm infrarenal abdominal aortic aneurysm with mural thrombus, no surgical intervention required as of 05/2021 followed up with Dr. Shepherd) COPD denies intubation/ hospitalizations), GERD, and BLANQUITA (Hgb: 12.4 on 05/2021), who initially presented to his cardiologist Dr. Zambrano endorsing BL LE throbbing type pain with walking <1 block. Patient was recently for peripheral angiogram gram w/ L sided intervention that improved his symptoms for approx 1-2 months, then symptoms returned. The pain extends the entire length of the legs. Patient denies open area, nonhealing wounds, legs feeling cool to the touch or hair loss. CTA Abd 7/12/21 peripheral arterial disease, Infrarenal abdominal aorta 4.5 cm , LCIA distal aneurysmal enlargement to 2.0 cm, PAMELA 1.0 cm penetrating ulcer associated with 40-50% stenosis, REIA 30-40% stenosis.  In light of patient's risk factors, [place] Class III sx, and abnormal CTA patient presents for peripheral angioplasty with possible intervention if clinically indicated.    Pt is s/p diagnostic cath 10/20/21:  Large aneurysm sacular, Patent, B/L Common Iliac Patent, Left common iliac aneurysmal Ulcerated Plaque).  Right Angio:   RCFA Patent, mSFA 30-40%, Popliteal patent. below knee 3V runoff.  Left Angio: CFA,  LSFA, Popliteal Patent. below knee 2V runoff, 100% AT Stenosis.  RCFA ([facility], 2 hours bedrest).  Of note:  Pt admitted overnight, received sedation and his his escort went home.    Pt seen at bedside today, examined found NAD, HD stable, denies any complaints of CP, SOB or palpitations at this time.  VSS.  EKG showed no acute ischemic events.  Tele Monitor/Labs reviewed.  Right Groin access stable without hematoma or bleed.  Right pulse stable, 2+, back to baseline.   Pt is to continue Plavix 75mg QD, Metoprolol Succinate 25mg QD, Amlodipine 10mg QD, Atorvastatin 40mg QD and Eliquis 5mg BID s prescribed.  NO ASA given as pt is on Eliquis for AFIB.  Pt is deemed stable for discharge per Dr Gallagher with follow up in 1-2 weeks with Dr Zambrano  for post discharge check-up.  Rx sent to pt preferred pharmacy.  Discharge plans and instruction discussed with pt who is agreeable with plan.      81 y/o Azeri Speaking Male w/ PMHx HTN, HLD, A-Fib (on Eliquis - last dose 10/19/21PM), chronic HFpEF (EF: 55% as per ECHO11/2020), PAD (05/2021 - Laser/DCB to L Popliteal (90-95%) and laser/PTA to L AT(90-95%), known 4.5 cm infrarenal abdominal aortic aneurysm with mural thrombus, no surgical intervention required as of 05/2021 followed up with Dr. Shepherd) COPD denies intubation/ hospitalizations), GERD, and BLANQUITA (Hgb: 12.4 on 05/2021), who initially presented to his cardiologist Dr. Zambrano endorsing BL LE throbbing type pain with walking <1 block. Patient was recently for peripheral angiogram gram w/ L sided intervention that improved his symptoms for approx 1-2 months, then symptoms returned. The pain extends the entire length of the legs. Patient denies open area, nonhealing wounds, legs feeling cool to the touch or hair loss. CTA Abd 7/12/21 peripheral arterial disease, Infrarenal abdominal aorta 4.5 cm , LCIA distal aneurysmal enlargement to 2.0 cm, PAMELA 1.0 cm penetrating ulcer associated with 40-50% stenosis, REIA 30-40% stenosis.  In light of patient's risk factors, [place] Class III sx, and abnormal CTA patient presents for peripheral angioplasty with possible intervention if clinically indicated.    Pt is s/p diagnostic cath 10/20/21:  Large aneurysm sacular, Patent, B/L Common Iliac Patent, Left common iliac aneurysmal Ulcerated Plaque).  Right Angio:   RCFA Patent, mSFA 30-40%, Popliteal patent. below knee 3V runoff.  Left Angio: CFA,  LSFA, Popliteal Patent. below knee 2V runoff, 100% AT Stenosis.  RCFA ([facility], 2 hours bedrest).  Of note:  Pt admitted overnight, received sedation and his his escort went home.    Pt seen at bedside today, examined found NAD, HD stable, denies any complaints of CP, SOB or palpitations at this time.  VSS.  EKG showed no acute ischemic events.  Tele Monitor/Labs reviewed.  Right Groin access stable without hematoma or bleed.  Right pulse stable, 2+, back to baseline.   Pt is to continue Plavix 75mg QD, Metoprolol Succinate 25mg QD, Amlodipine 10mg QD, Atorvastatin 40mg QD and Eliquis 5mg BID s prescribed.  NO ASA given as pt is on Eliquis for AFIB.  Pt is deemed stable for discharge per Dr Gallagher with follow up in 1-2 weeks with Dr Zambrano  for post discharge check-up.  Rx sent to pt preferred pharmacy.  Discharge plans and instruction discussed with pt who is agreeable with plan.   Please return to the ED if worsening lower extremity pain or severe bleeding from the access site occurs.      81 y/o Irish Speaking Male w/ PMHx HTN, HLD, A-Fib (on Eliquis - last dose 10/19/21PM), chronic HFpEF (EF: 55% as per ECHO11/2020), PAD (05/2021 - Laser/DCB to L Popliteal (90-95%) and laser/PTA to L AT(90-95%), known 4.5 cm infrarenal abdominal aortic aneurysm with mural thrombus, no surgical intervention required as of 05/2021 followed up with Dr. Shepherd) COPD denies intubation/ hospitalizations), GERD, and BLANQUITA (Hgb: 12.4 on 05/2021), who initially presented to his cardiologist Dr. Zambrano endorsing BL LE throbbing type pain with walking <1 block. Patient was recently for peripheral angiogram gram w/ L sided intervention that improved his symptoms for approx 1-2 months, then symptoms returned. The pain extends the entire length of the legs. Patient denies open area, nonhealing wounds, legs feeling cool to the touch or hair loss. CTA Abd 7/12/21 peripheral arterial disease, Infrarenal abdominal aorta 4.5 cm , LCIA distal aneurysmal enlargement to 2.0 cm, PAMELA 1.0 cm penetrating ulcer associated with 40-50% stenosis, REIA 30-40% stenosis.  In light of patient's risk factors, [place] Class III sx, and abnormal CTA patient presents for peripheral angioplasty with possible intervention if clinically indicated.    Pt is s/p diagnostic cath 10/20/21:  Large aneurysm sacular, Patent, B/L Common Iliac Patent, Left common iliac aneurysmal Ulcerated Plaque).  Right Angio:   RCFA Patent, mSFA 30-40%, Popliteal patent. below knee 3V runoff.  Left Angio: CFA,  LSFA, Popliteal Patent. below knee 2V runoff, 100% AT Stenosis.  RCFA ([facility], 2 hours bedrest).  Of note:  Pt admitted overnight, received sedation and his his escort went home.    Pt seen at bedside today, examined found NAD, HD stable, denies any complaints of CP, SOB or palpitations at this time.  VSS.  EKG showed no acute ischemic events.  Tele Monitor/Labs reviewed.  Right Groin access stable without hematoma or bleed.  Right pulse stable, 2+, back to baseline.   Pt is to continue home Plavix 75mg QD, Metoprolol Succinate 25mg QD, Amlodipine 10mg QD, Atorvastatin 40mg QD and Eliquis 5mg BID s prescribed.  NO ASA given as pt is on Eliquis for AFIB.  Pt is deemed stable for discharge per Dr Gallagher with follow up in 1-2 weeks with Dr Zambrano  for post discharge check-up.  No Rx sent to pt preferred pharmacy as there were no changes in home medications.  Discharge plans and instruction discussed with pt who is agreeable with plan.   Please return to the ED if worsening lower extremity pain or severe bleeding from the access site occurs.

## 2021-10-27 DIAGNOSIS — E11.51 TYPE 2 DIABETES MELLITUS WITH DIABETIC PERIPHERAL ANGIOPATHY WITHOUT GANGRENE: ICD-10-CM

## 2021-10-27 DIAGNOSIS — K21.9 GASTRO-ESOPHAGEAL REFLUX DISEASE WITHOUT ESOPHAGITIS: ICD-10-CM

## 2021-10-27 DIAGNOSIS — I48.92 UNSPECIFIED ATRIAL FLUTTER: ICD-10-CM

## 2021-10-27 DIAGNOSIS — Z79.01 LONG TERM (CURRENT) USE OF ANTICOAGULANTS: ICD-10-CM

## 2021-10-27 DIAGNOSIS — I11.0 HYPERTENSIVE HEART DISEASE WITH HEART FAILURE: ICD-10-CM

## 2021-10-27 DIAGNOSIS — I50.32 CHRONIC DIASTOLIC (CONGESTIVE) HEART FAILURE: ICD-10-CM

## 2021-10-27 DIAGNOSIS — I48.20 CHRONIC ATRIAL FIBRILLATION, UNSPECIFIED: ICD-10-CM

## 2021-10-27 DIAGNOSIS — Z79.02 LONG TERM (CURRENT) USE OF ANTITHROMBOTICS/ANTIPLATELETS: ICD-10-CM

## 2021-10-27 DIAGNOSIS — I71.4 ABDOMINAL AORTIC ANEURYSM, WITHOUT RUPTURE: ICD-10-CM

## 2021-10-27 DIAGNOSIS — Z90.49 ACQUIRED ABSENCE OF OTHER SPECIFIED PARTS OF DIGESTIVE TRACT: ICD-10-CM

## 2021-10-27 DIAGNOSIS — I77.1 STRICTURE OF ARTERY: ICD-10-CM

## 2021-10-27 DIAGNOSIS — J44.9 CHRONIC OBSTRUCTIVE PULMONARY DISEASE, UNSPECIFIED: ICD-10-CM

## 2021-10-27 DIAGNOSIS — I72.3 ANEURYSM OF ILIAC ARTERY: ICD-10-CM

## 2021-10-27 DIAGNOSIS — E78.5 HYPERLIPIDEMIA, UNSPECIFIED: ICD-10-CM

## 2021-11-05 ENCOUNTER — APPOINTMENT (OUTPATIENT)
Dept: HEART AND VASCULAR | Facility: CLINIC | Age: 81
End: 2021-11-05
Payer: MEDICARE

## 2021-11-05 VITALS
DIASTOLIC BLOOD PRESSURE: 78 MMHG | BODY MASS INDEX: 30.36 KG/M2 | SYSTOLIC BLOOD PRESSURE: 132 MMHG | WEIGHT: 165 LBS | RESPIRATION RATE: 12 BRPM | HEIGHT: 62 IN

## 2021-11-05 VITALS — DIASTOLIC BLOOD PRESSURE: 72 MMHG | SYSTOLIC BLOOD PRESSURE: 128 MMHG

## 2021-11-05 PROCEDURE — 99213 OFFICE O/P EST LOW 20 MIN: CPT

## 2021-11-21 NOTE — REASON FOR VISIT
[FreeTextEntry1] : 81 yo M with PMH HTn, smoking, interstitial lung disease, PVD s/p LLE stents, who was found on CT to have a AAA in April measuring 4.4cm, here for routine follow up. Repeat ultrasound shows aneurysm size to be 4.9cm, and he is seen by Dr. Henderson for the same, he had a CTA that showed that the size of his aneurysm was unchaged and stable at 4.5cm. He denies any abdominal or back pain. He underwent a peripheral angiogram for life style limiting claudication. His prior intervention site on the left pop was patent and there is no significant flow limiting disease on the right side.

## 2021-11-21 NOTE — ASSESSMENT
[FreeTextEntry1] : - continue risk factor modification\par - continue Eliquis 5mg PO BID\par - DC plavix\par - Follow up with Dr. Henderson for AAA.\par - RTC in 6 months

## 2021-12-01 PROCEDURE — G9005: CPT

## 2021-12-22 PROCEDURE — C1894: CPT

## 2021-12-22 PROCEDURE — 80048 BASIC METABOLIC PNL TOTAL CA: CPT

## 2021-12-22 PROCEDURE — 86769 SARS-COV-2 COVID-19 ANTIBODY: CPT

## 2021-12-22 PROCEDURE — 80061 LIPID PANEL: CPT

## 2021-12-22 PROCEDURE — 85730 THROMBOPLASTIN TIME PARTIAL: CPT

## 2021-12-22 PROCEDURE — 83036 HEMOGLOBIN GLYCOSYLATED A1C: CPT

## 2021-12-22 PROCEDURE — 82553 CREATINE MB FRACTION: CPT

## 2021-12-22 PROCEDURE — 83735 ASSAY OF MAGNESIUM: CPT

## 2021-12-22 PROCEDURE — 85027 COMPLETE CBC AUTOMATED: CPT

## 2021-12-22 PROCEDURE — 82550 ASSAY OF CK (CPK): CPT

## 2021-12-22 PROCEDURE — 85610 PROTHROMBIN TIME: CPT

## 2021-12-22 PROCEDURE — 36415 COLL VENOUS BLD VENIPUNCTURE: CPT

## 2021-12-22 PROCEDURE — 85025 COMPLETE CBC W/AUTO DIFF WBC: CPT

## 2021-12-22 PROCEDURE — C1769: CPT

## 2021-12-22 PROCEDURE — C1760: CPT

## 2021-12-22 PROCEDURE — 93005 ELECTROCARDIOGRAM TRACING: CPT

## 2021-12-22 PROCEDURE — 80053 COMPREHEN METABOLIC PANEL: CPT

## 2022-01-14 ENCOUNTER — APPOINTMENT (OUTPATIENT)
Dept: VASCULAR SURGERY | Facility: CLINIC | Age: 82
End: 2022-01-14
Payer: MEDICARE

## 2022-01-14 PROCEDURE — 99213 OFFICE O/P EST LOW 20 MIN: CPT

## 2022-01-14 NOTE — HISTORY OF PRESENT ILLNESS
[FreeTextEntry1] : 81 yo M with PMH HTn, smoking, interstitial lung disease, PVD s/p LLE stents with Dr Zambrano, found on CT to have a AAA in April measuring 4.4cm, here for routine follow up. Repeat ultrasound shows aneurysm size to be 4.9cm. He denies any abdominal or back pain. He does complain of RLE 1 block claudication that has slowly been improving with an exercise regimen. \par \par He denies any chest pain, SOB, nausea or vomiting. He has no family history of AAA as far as he knows. Has no history of any coronary artery disease.  [de-identified] : Repeat CTA shows growth of his saccular aneurysm from 4.3cm to approximately 5 cm in 6 months. He denies any abdominal pain, chest pain or SOB.

## 2022-01-14 NOTE — ASSESSMENT
[FreeTextEntry1] : 79 yo M with growing saccular AAA\par \par 1) Will plan for EVAR, discussed risk/benefits of surgery and options of open or endovascular repair\par 2) Will see his cardiologist for clearance prior to surgery

## 2022-01-14 NOTE — PHYSICAL EXAM
[2+] : left 2+ [0] : right 0 [1+] : left 1+ [Ankle Swelling (On Exam)] : not present [de-identified] : well appearing, NAD

## 2022-01-28 ENCOUNTER — RESULT CHARGE (OUTPATIENT)
Age: 82
End: 2022-01-28

## 2022-01-28 ENCOUNTER — APPOINTMENT (OUTPATIENT)
Dept: HEART AND VASCULAR | Facility: CLINIC | Age: 82
End: 2022-01-28
Payer: MEDICARE

## 2022-01-28 ENCOUNTER — NON-APPOINTMENT (OUTPATIENT)
Age: 82
End: 2022-01-28

## 2022-01-28 VITALS
HEART RATE: 87 BPM | DIASTOLIC BLOOD PRESSURE: 70 MMHG | RESPIRATION RATE: 12 BRPM | WEIGHT: 165 LBS | SYSTOLIC BLOOD PRESSURE: 102 MMHG | BODY MASS INDEX: 30.36 KG/M2 | HEIGHT: 62 IN

## 2022-01-28 DIAGNOSIS — I73.9 PERIPHERAL VASCULAR DISEASE, UNSPECIFIED: ICD-10-CM

## 2022-01-28 DIAGNOSIS — Z01.810 ENCOUNTER FOR PREPROCEDURAL CARDIOVASCULAR EXAMINATION: ICD-10-CM

## 2022-01-28 PROCEDURE — 93880 EXTRACRANIAL BILAT STUDY: CPT

## 2022-01-28 PROCEDURE — 99214 OFFICE O/P EST MOD 30 MIN: CPT

## 2022-01-28 PROCEDURE — ZZZZZ: CPT

## 2022-01-28 PROCEDURE — 93306 TTE W/DOPPLER COMPLETE: CPT

## 2022-02-01 NOTE — PHYSICAL EXAM
[General Appearance - Well Developed] : well developed [Normal Appearance] : normal appearance [Well Groomed] : well groomed [General Appearance - Well Nourished] : well nourished [No Deformities] : no deformities [General Appearance - In No Acute Distress] : no acute distress [Normal Oral Mucosa] : normal oral mucosa [No Oral Pallor] : no oral pallor [No Oral Cyanosis] : no oral cyanosis [Normal Jugular Venous A Waves Present] : normal jugular venous A waves present [Normal Jugular Venous V Waves Present] : normal jugular venous V waves present [No Jugular Venous Garza A Waves] : no jugular venous garza A waves [Respiration, Rhythm And Depth] : normal respiratory rhythm and effort [Exaggerated Use Of Accessory Muscles For Inspiration] : no accessory muscle use [Auscultation Breath Sounds / Voice Sounds] : lungs were clear to auscultation bilaterally [Heart Rate And Rhythm] : heart rate and rhythm were normal [Arterial Pulses Normal] : the arterial pulses were normal [Edema] : no peripheral edema present [Abdomen Soft] : soft [Abdomen Tenderness] : non-tender [Abdomen Mass (___ Cm)] : no abdominal mass palpated [Abnormal Walk] : normal gait [Gait - Sufficient For Exercise Testing] : the gait was sufficient for exercise testing [Nail Clubbing] : no clubbing of the fingernails [Cyanosis, Localized] : no localized cyanosis [Petechial Hemorrhages (___cm)] : no petechial hemorrhages [] : no ischemic changes [Oriented To Time, Place, And Person] : oriented to person, place, and time [Affect] : the affect was normal [Mood] : the mood was normal [No Anxiety] : not feeling anxious [FreeTextEntry1] : irreg irreg III/VI SYD, carotid bruit

## 2022-02-01 NOTE — HISTORY OF PRESENT ILLNESS
[Preoperative Visit] : for a medical evaluation prior to surgery [Scheduled Procedure ___] : a [unfilled] [Stable] : Stable [Chest Pain] : no chest pain [Dyspnea] : no dyspnea [Lower Extremity Swelling] : no lower extremity swelling [FreeTextEntry1] : 81-year-old male with a past medical history of peripheral vascular disease, hypertension hyperlipidemia atrial fibrillation. Patient recently underwent stenting procedure of lower extremity with improvement intermittent claudication. He was found to have a 4.4 cm AAA plan for EVAR, patient does 2 mets daily activity without chest pain shortness of breath PND or orthopnea.

## 2022-02-01 NOTE — DISCUSSION/SUMMARY
[Procedure Intermediate Risk] : the procedure risk is intermediate [Patient Intermediate Risk] : the patient is an intermediate risk [As per surgery] : as per surgery [Continue] : Continue medications as currently directed [Optimized for Surgery] : the patient is optimized for surgery [FreeTextEntry1] : 1. Preop clearance for AAA EVAR: Patient is intermediate risk for low- intermediate risk procedure pt currently without cp sob, pnd orthopnea on exertional actiivty and is undergoing EVAR may proceed as planned. \par 2. Chronic diastolic dysfunction: Echocardiogram\par 3. Atrial fibrillation: Continue metoprolol and eliquis EKG.\par 4. Hyperlipidemia: Continue gemfibrozil\par 5. Carotid bruit: Carotid duplex

## 2022-02-06 ENCOUNTER — TRANSCRIPTION ENCOUNTER (OUTPATIENT)
Age: 82
End: 2022-02-06

## 2022-02-07 ENCOUNTER — INPATIENT (INPATIENT)
Facility: HOSPITAL | Age: 82
LOS: 0 days | Discharge: HOME | DRG: 269 | End: 2022-02-08
Attending: STUDENT IN AN ORGANIZED HEALTH CARE EDUCATION/TRAINING PROGRAM | Admitting: STUDENT IN AN ORGANIZED HEALTH CARE EDUCATION/TRAINING PROGRAM
Payer: MEDICARE

## 2022-02-07 ENCOUNTER — APPOINTMENT (OUTPATIENT)
Dept: VASCULAR SURGERY | Facility: HOSPITAL | Age: 82
End: 2022-02-07

## 2022-02-07 VITALS
HEART RATE: 98 BPM | OXYGEN SATURATION: 98 % | SYSTOLIC BLOOD PRESSURE: 122 MMHG | RESPIRATION RATE: 16 BRPM | DIASTOLIC BLOOD PRESSURE: 77 MMHG

## 2022-02-07 DIAGNOSIS — Z90.49 ACQUIRED ABSENCE OF OTHER SPECIFIED PARTS OF DIGESTIVE TRACT: Chronic | ICD-10-CM

## 2022-02-07 DIAGNOSIS — Z98.890 OTHER SPECIFIED POSTPROCEDURAL STATES: Chronic | ICD-10-CM

## 2022-02-07 LAB
ANION GAP SERPL CALC-SCNC: 11 MMOL/L — SIGNIFICANT CHANGE UP (ref 5–17)
ANION GAP SERPL CALC-SCNC: 11 MMOL/L — SIGNIFICANT CHANGE UP (ref 5–17)
APTT BLD: 27.9 SEC — SIGNIFICANT CHANGE UP (ref 27.5–35.5)
BASOPHILS # BLD AUTO: 0.03 K/UL — SIGNIFICANT CHANGE UP (ref 0–0.2)
BASOPHILS # BLD AUTO: 0.04 K/UL — SIGNIFICANT CHANGE UP (ref 0–0.2)
BASOPHILS NFR BLD AUTO: 0.4 % — SIGNIFICANT CHANGE UP (ref 0–2)
BASOPHILS NFR BLD AUTO: 0.5 % — SIGNIFICANT CHANGE UP (ref 0–2)
BLD GP AB SCN SERPL QL: NEGATIVE — SIGNIFICANT CHANGE UP
BLD GP AB SCN SERPL QL: NEGATIVE — SIGNIFICANT CHANGE UP
BUN SERPL-MCNC: 15 MG/DL — SIGNIFICANT CHANGE UP (ref 7–23)
BUN SERPL-MCNC: 15 MG/DL — SIGNIFICANT CHANGE UP (ref 7–23)
CALCIUM SERPL-MCNC: 8.3 MG/DL — LOW (ref 8.4–10.5)
CALCIUM SERPL-MCNC: 9.5 MG/DL — SIGNIFICANT CHANGE UP (ref 8.4–10.5)
CHLORIDE SERPL-SCNC: 104 MMOL/L — SIGNIFICANT CHANGE UP (ref 96–108)
CHLORIDE SERPL-SCNC: 107 MMOL/L — SIGNIFICANT CHANGE UP (ref 96–108)
CO2 SERPL-SCNC: 23 MMOL/L — SIGNIFICANT CHANGE UP (ref 22–31)
CO2 SERPL-SCNC: 27 MMOL/L — SIGNIFICANT CHANGE UP (ref 22–31)
CREAT SERPL-MCNC: 0.93 MG/DL — SIGNIFICANT CHANGE UP (ref 0.5–1.3)
CREAT SERPL-MCNC: 1.03 MG/DL — SIGNIFICANT CHANGE UP (ref 0.5–1.3)
EOSINOPHIL # BLD AUTO: 0.21 K/UL — SIGNIFICANT CHANGE UP (ref 0–0.5)
EOSINOPHIL # BLD AUTO: 0.32 K/UL — SIGNIFICANT CHANGE UP (ref 0–0.5)
EOSINOPHIL NFR BLD AUTO: 2.9 % — SIGNIFICANT CHANGE UP (ref 0–6)
EOSINOPHIL NFR BLD AUTO: 4.2 % — SIGNIFICANT CHANGE UP (ref 0–6)
GLUCOSE SERPL-MCNC: 119 MG/DL — HIGH (ref 70–99)
GLUCOSE SERPL-MCNC: 127 MG/DL — HIGH (ref 70–99)
HCT VFR BLD CALC: 38.4 % — LOW (ref 39–50)
HCT VFR BLD CALC: 42.1 % — SIGNIFICANT CHANGE UP (ref 39–50)
HGB BLD-MCNC: 12.1 G/DL — LOW (ref 13–17)
HGB BLD-MCNC: 13.7 G/DL — SIGNIFICANT CHANGE UP (ref 13–17)
IMM GRANULOCYTES NFR BLD AUTO: 0.4 % — SIGNIFICANT CHANGE UP (ref 0–1.5)
IMM GRANULOCYTES NFR BLD AUTO: 1.4 % — SIGNIFICANT CHANGE UP (ref 0–1.5)
INR BLD: 0.98 — SIGNIFICANT CHANGE UP (ref 0.88–1.16)
ISTAT INR: 1 — SIGNIFICANT CHANGE UP (ref 0.88–1.16)
ISTAT PT: 12.2 SEC — SIGNIFICANT CHANGE UP (ref 10–12.9)
LYMPHOCYTES # BLD AUTO: 1.58 K/UL — SIGNIFICANT CHANGE UP (ref 1–3.3)
LYMPHOCYTES # BLD AUTO: 1.96 K/UL — SIGNIFICANT CHANGE UP (ref 1–3.3)
LYMPHOCYTES # BLD AUTO: 22.2 % — SIGNIFICANT CHANGE UP (ref 13–44)
LYMPHOCYTES # BLD AUTO: 25.8 % — SIGNIFICANT CHANGE UP (ref 13–44)
MAGNESIUM SERPL-MCNC: 1.5 MG/DL — LOW (ref 1.6–2.6)
MCHC RBC-ENTMCNC: 28.3 PG — SIGNIFICANT CHANGE UP (ref 27–34)
MCHC RBC-ENTMCNC: 29.1 PG — SIGNIFICANT CHANGE UP (ref 27–34)
MCHC RBC-ENTMCNC: 31.5 GM/DL — LOW (ref 32–36)
MCHC RBC-ENTMCNC: 32.5 GM/DL — SIGNIFICANT CHANGE UP (ref 32–36)
MCV RBC AUTO: 89.6 FL — SIGNIFICANT CHANGE UP (ref 80–100)
MCV RBC AUTO: 89.7 FL — SIGNIFICANT CHANGE UP (ref 80–100)
MONOCYTES # BLD AUTO: 0.18 K/UL — SIGNIFICANT CHANGE UP (ref 0–0.9)
MONOCYTES # BLD AUTO: 0.55 K/UL — SIGNIFICANT CHANGE UP (ref 0–0.9)
MONOCYTES NFR BLD AUTO: 2.5 % — SIGNIFICANT CHANGE UP (ref 2–14)
MONOCYTES NFR BLD AUTO: 7.2 % — SIGNIFICANT CHANGE UP (ref 2–14)
NEUTROPHILS # BLD AUTO: 4.7 K/UL — SIGNIFICANT CHANGE UP (ref 1.8–7.4)
NEUTROPHILS # BLD AUTO: 5.02 K/UL — SIGNIFICANT CHANGE UP (ref 1.8–7.4)
NEUTROPHILS NFR BLD AUTO: 61.9 % — SIGNIFICANT CHANGE UP (ref 43–77)
NEUTROPHILS NFR BLD AUTO: 70.6 % — SIGNIFICANT CHANGE UP (ref 43–77)
NRBC # BLD: 0 /100 WBCS — SIGNIFICANT CHANGE UP (ref 0–0)
NRBC # BLD: 0 /100 WBCS — SIGNIFICANT CHANGE UP (ref 0–0)
PHOSPHATE SERPL-MCNC: 3.2 MG/DL — SIGNIFICANT CHANGE UP (ref 2.5–4.5)
PLATELET # BLD AUTO: 187 K/UL — SIGNIFICANT CHANGE UP (ref 150–400)
PLATELET # BLD AUTO: 258 K/UL — SIGNIFICANT CHANGE UP (ref 150–400)
POCT ISTAT CREATININE: 1 MG/DL — SIGNIFICANT CHANGE UP (ref 0.5–1.3)
POTASSIUM SERPL-MCNC: 4.2 MMOL/L — SIGNIFICANT CHANGE UP (ref 3.5–5.3)
POTASSIUM SERPL-MCNC: 4.2 MMOL/L — SIGNIFICANT CHANGE UP (ref 3.5–5.3)
POTASSIUM SERPL-SCNC: 4.2 MMOL/L — SIGNIFICANT CHANGE UP (ref 3.5–5.3)
POTASSIUM SERPL-SCNC: 4.2 MMOL/L — SIGNIFICANT CHANGE UP (ref 3.5–5.3)
PROTHROM AB SERPL-ACNC: 11.8 SEC — SIGNIFICANT CHANGE UP (ref 10.6–13.6)
RBC # BLD: 4.28 M/UL — SIGNIFICANT CHANGE UP (ref 4.2–5.8)
RBC # BLD: 4.7 M/UL — SIGNIFICANT CHANGE UP (ref 4.2–5.8)
RBC # FLD: 17.2 % — HIGH (ref 10.3–14.5)
RBC # FLD: 17.3 % — HIGH (ref 10.3–14.5)
RH IG SCN BLD-IMP: POSITIVE — SIGNIFICANT CHANGE UP
RH IG SCN BLD-IMP: POSITIVE — SIGNIFICANT CHANGE UP
SODIUM SERPL-SCNC: 141 MMOL/L — SIGNIFICANT CHANGE UP (ref 135–145)
SODIUM SERPL-SCNC: 142 MMOL/L — SIGNIFICANT CHANGE UP (ref 135–145)
WBC # BLD: 7.12 K/UL — SIGNIFICANT CHANGE UP (ref 3.8–10.5)
WBC # BLD: 7.6 K/UL — SIGNIFICANT CHANGE UP (ref 3.8–10.5)
WBC # FLD AUTO: 7.12 K/UL — SIGNIFICANT CHANGE UP (ref 3.8–10.5)
WBC # FLD AUTO: 7.6 K/UL — SIGNIFICANT CHANGE UP (ref 3.8–10.5)

## 2022-02-07 PROCEDURE — 93010 ELECTROCARDIOGRAM REPORT: CPT

## 2022-02-07 PROCEDURE — 34705 EVAC RPR A-BIILIAC NDGFT: CPT | Mod: 80,GC

## 2022-02-07 PROCEDURE — 34705 EVAC RPR A-BIILIAC NDGFT: CPT | Mod: GC

## 2022-02-07 RX ORDER — METOPROLOL TARTRATE 50 MG
5 TABLET ORAL ONCE
Refills: 0 | Status: COMPLETED | OUTPATIENT
Start: 2022-02-07 | End: 2022-02-07

## 2022-02-07 RX ORDER — CHLORHEXIDINE GLUCONATE 213 G/1000ML
1 SOLUTION TOPICAL ONCE
Refills: 0 | Status: DISCONTINUED | OUTPATIENT
Start: 2022-02-07 | End: 2022-02-08

## 2022-02-07 RX ORDER — FOLIC ACID 0.8 MG
1 TABLET ORAL DAILY
Refills: 0 | Status: DISCONTINUED | OUTPATIENT
Start: 2022-02-07 | End: 2022-02-08

## 2022-02-07 RX ORDER — ACETAMINOPHEN 500 MG
325 TABLET ORAL EVERY 4 HOURS
Refills: 0 | Status: DISCONTINUED | OUTPATIENT
Start: 2022-02-07 | End: 2022-02-08

## 2022-02-07 RX ORDER — METOPROLOL TARTRATE 50 MG
25 TABLET ORAL DAILY
Refills: 0 | Status: DISCONTINUED | OUTPATIENT
Start: 2022-02-07 | End: 2022-02-08

## 2022-02-07 RX ORDER — SODIUM CHLORIDE 9 MG/ML
1000 INJECTION INTRAMUSCULAR; INTRAVENOUS; SUBCUTANEOUS
Refills: 0 | Status: DISCONTINUED | OUTPATIENT
Start: 2022-02-07 | End: 2022-02-07

## 2022-02-07 RX ORDER — PANTOPRAZOLE SODIUM 20 MG/1
40 TABLET, DELAYED RELEASE ORAL DAILY
Refills: 0 | Status: DISCONTINUED | OUTPATIENT
Start: 2022-02-07 | End: 2022-02-08

## 2022-02-07 RX ORDER — ATORVASTATIN CALCIUM 80 MG/1
40 TABLET, FILM COATED ORAL DAILY
Refills: 0 | Status: DISCONTINUED | OUTPATIENT
Start: 2022-02-07 | End: 2022-02-08

## 2022-02-07 RX ORDER — CEFAZOLIN SODIUM 1 G
1000 VIAL (EA) INJECTION EVERY 8 HOURS
Refills: 0 | Status: COMPLETED | OUTPATIENT
Start: 2022-02-07 | End: 2022-02-08

## 2022-02-07 RX ORDER — HEPARIN SODIUM 5000 [USP'U]/ML
5000 INJECTION INTRAVENOUS; SUBCUTANEOUS EVERY 8 HOURS
Refills: 0 | Status: DISCONTINUED | OUTPATIENT
Start: 2022-02-07 | End: 2022-02-08

## 2022-02-07 RX ORDER — HYDROMORPHONE HYDROCHLORIDE 2 MG/ML
0.25 INJECTION INTRAMUSCULAR; INTRAVENOUS; SUBCUTANEOUS
Refills: 0 | Status: DISCONTINUED | OUTPATIENT
Start: 2022-02-07 | End: 2022-02-07

## 2022-02-07 RX ORDER — INFLUENZA VIRUS VACCINE 15; 15; 15; 15 UG/.5ML; UG/.5ML; UG/.5ML; UG/.5ML
0.7 SUSPENSION INTRAMUSCULAR ONCE
Refills: 0 | Status: DISCONTINUED | OUTPATIENT
Start: 2022-02-07 | End: 2022-02-08

## 2022-02-07 RX ORDER — CLOPIDOGREL BISULFATE 75 MG/1
75 TABLET, FILM COATED ORAL DAILY
Refills: 0 | Status: DISCONTINUED | OUTPATIENT
Start: 2022-02-07 | End: 2022-02-08

## 2022-02-07 RX ORDER — AMLODIPINE BESYLATE 2.5 MG/1
10 TABLET ORAL DAILY
Refills: 0 | Status: DISCONTINUED | OUTPATIENT
Start: 2022-02-07 | End: 2022-02-08

## 2022-02-07 RX ORDER — CHOLECALCIFEROL (VITAMIN D3) 125 MCG
1000 CAPSULE ORAL DAILY
Refills: 0 | Status: DISCONTINUED | OUTPATIENT
Start: 2022-02-07 | End: 2022-02-08

## 2022-02-07 RX ORDER — MAGNESIUM SULFATE 500 MG/ML
2 VIAL (ML) INJECTION ONCE
Refills: 0 | Status: COMPLETED | OUTPATIENT
Start: 2022-02-07 | End: 2022-02-07

## 2022-02-07 RX ORDER — HEPARIN SODIUM 5000 [USP'U]/ML
5000 INJECTION INTRAVENOUS; SUBCUTANEOUS ONCE
Refills: 0 | Status: COMPLETED | OUTPATIENT
Start: 2022-02-07 | End: 2022-02-07

## 2022-02-07 RX ADMIN — HYDROMORPHONE HYDROCHLORIDE 0.25 MILLIGRAM(S): 2 INJECTION INTRAMUSCULAR; INTRAVENOUS; SUBCUTANEOUS at 21:40

## 2022-02-07 RX ADMIN — Medication 25 MILLIGRAM(S): at 22:12

## 2022-02-07 RX ADMIN — Medication 25 GRAM(S): at 14:05

## 2022-02-07 RX ADMIN — Medication 5 MILLIGRAM(S): at 13:11

## 2022-02-07 RX ADMIN — AMLODIPINE BESYLATE 10 MILLIGRAM(S): 2.5 TABLET ORAL at 16:31

## 2022-02-07 RX ADMIN — HEPARIN SODIUM 5000 UNIT(S): 5000 INJECTION INTRAVENOUS; SUBCUTANEOUS at 22:12

## 2022-02-07 RX ADMIN — HYDROMORPHONE HYDROCHLORIDE 0.25 MILLIGRAM(S): 2 INJECTION INTRAMUSCULAR; INTRAVENOUS; SUBCUTANEOUS at 21:12

## 2022-02-07 RX ADMIN — Medication 100 MILLIGRAM(S): at 17:18

## 2022-02-07 RX ADMIN — HEPARIN SODIUM 5000 UNIT(S): 5000 INJECTION INTRAVENOUS; SUBCUTANEOUS at 14:31

## 2022-02-07 RX ADMIN — ATORVASTATIN CALCIUM 40 MILLIGRAM(S): 80 TABLET, FILM COATED ORAL at 22:12

## 2022-02-07 RX ADMIN — SODIUM CHLORIDE 80 MILLILITER(S): 9 INJECTION INTRAMUSCULAR; INTRAVENOUS; SUBCUTANEOUS at 14:06

## 2022-02-07 RX ADMIN — CLOPIDOGREL BISULFATE 75 MILLIGRAM(S): 75 TABLET, FILM COATED ORAL at 16:31

## 2022-02-07 RX ADMIN — HYDROMORPHONE HYDROCHLORIDE 0.25 MILLIGRAM(S): 2 INJECTION INTRAMUSCULAR; INTRAVENOUS; SUBCUTANEOUS at 13:30

## 2022-02-07 RX ADMIN — Medication 5 MILLIGRAM(S): at 22:44

## 2022-02-07 RX ADMIN — HYDROMORPHONE HYDROCHLORIDE 0.25 MILLIGRAM(S): 2 INJECTION INTRAMUSCULAR; INTRAVENOUS; SUBCUTANEOUS at 13:10

## 2022-02-07 RX ADMIN — Medication 1 MILLIGRAM(S): at 16:31

## 2022-02-07 NOTE — H&P ADULT - HISTORY OF PRESENT ILLNESS
Attending: LIV Izquierdo, 81y, Male  MRN:  [id number]      HPI:    81 y/o M with a PMH of HTN, afib on eliquis (last take friday), smoking, interstitial lung diease, PAD s/p LLE stents with Dr. Zambrano found to have iAAA on CT in April 2020 measuring 4.3cm. iAAA grew to 5cm 6 months later on f/u scan. Patient presenting for elective repair of iAAA. No c/p, SOB, abd pain, back pain, fevers, chills.      PAST MEDICAL & SURGICAL HISTORY:  Chronic atrial fibrillation    Carotid bruit    Chronic heart failure with preserved ejection fraction (HFpEF)    COPD (chronic obstructive pulmonary disease)    HTN (hypertension)    HLD (hyperlipidemia)    Diabetes    Iron deficiency anemia    S/P cholecystectomy    H/O knee surgery  Right        Home Medications:  allopurinol 100 mg oral tablet: 1 tab(s) orally 2 times a day, As Needed (20 Oct 2021 15:23)  folic acid 1 mg oral tablet: 1 tab(s) orally once a day (20 Oct 2021 15:23)  Linzess 145 mcg oral capsule: 1 cap(s) orally once a day, As Needed (20 Oct 2021 15:23)  omeprazole 40 mg oral delayed release capsule: 1 cap(s) orally once a day (20 Oct 2021 15:23)  Vitamin D3 25 mcg (1000 intl units) oral tablet: 1 tab(s) orally once a day (20 Oct 2021 15:23)    PHYSICAL EXAM:  General: NAD, resting comfortably  Pulmonary: Nonlabored breathing, no respiratory distress,  Cardiovascular: irregularly irregular beat  Abdominal: soft, NT, ND  Extremities: warm, well perfused. triphasic dp/pt bilaterally    LABS:                          13.7   7.60  )-----------( 258      ( 07 Feb 2022 09:24 )             42.1           PT/INR - ( 07 Feb 2022 09:24 )   PT: 11.8 sec;   INR: 0.98          PTT - ( 07 Feb 2022 09:24 )  PTT:27.9 sec        CAPILLARY BLOOD GLUCOSE        CAPILLARY BLOOD GLUCOSE            Microbiology:        RADIOLOGY & ADDITIONAL STUDIES:   Attending: LIV Izquierdo, 81y, Male  MRN:  [id number]      HPI:    79 y/o M with a PMH of HTN, afib on eliquis (last take friday), smoking, interstitial lung diease, PAD s/p LLE stents with Dr. Zambrano found to have iAAA on CT in April 2020 measuring 4.3cm. iAAA grew to 5cm 6 months later on f/u scan. Patient presenting for elective repair of iAAA. He also does have RLE 1 block claudication as well. No c/p, SOB, abd pain, back pain, fevers, chills.      PAST MEDICAL & SURGICAL HISTORY:  Chronic atrial fibrillation    Carotid bruit    Chronic heart failure with preserved ejection fraction (HFpEF)    COPD (chronic obstructive pulmonary disease)    HTN (hypertension)    HLD (hyperlipidemia)    Diabetes    Iron deficiency anemia    S/P cholecystectomy    H/O knee surgery  Right        Home Medications:  allopurinol 100 mg oral tablet: 1 tab(s) orally 2 times a day, As Needed (20 Oct 2021 15:23)  folic acid 1 mg oral tablet: 1 tab(s) orally once a day (20 Oct 2021 15:23)  Linzess 145 mcg oral capsule: 1 cap(s) orally once a day, As Needed (20 Oct 2021 15:23)  omeprazole 40 mg oral delayed release capsule: 1 cap(s) orally once a day (20 Oct 2021 15:23)  Vitamin D3 25 mcg (1000 intl units) oral tablet: 1 tab(s) orally once a day (20 Oct 2021 15:23)    PHYSICAL EXAM:  General: NAD, resting comfortably  Pulmonary: Nonlabored breathing, no respiratory distress,  Cardiovascular: irregularly irregular beat  Abdominal: soft, NT, ND  Extremities: warm, well perfused. triphasic dp/pt bilaterally    LABS:                          13.7   7.60  )-----------( 258      ( 07 Feb 2022 09:24 )             42.1           PT/INR - ( 07 Feb 2022 09:24 )   PT: 11.8 sec;   INR: 0.98          PTT - ( 07 Feb 2022 09:24 )  PTT:27.9 sec        CAPILLARY BLOOD GLUCOSE        CAPILLARY BLOOD GLUCOSE            Microbiology:        RADIOLOGY & ADDITIONAL STUDIES:

## 2022-02-07 NOTE — BRIEF OPERATIVE NOTE - NSICDXBRIEFPROCEDURE_GEN_ALL_CORE_FT
PROCEDURES:  Endovascular repair of infrarenal abdominal aorta and iliac arteries on both sides of pelvis 07-Feb-2022 10:07:10  Latanya Hooper   PROVIDER:[TOKEN:[[id number]],FOLLOWUP:[1-3 days],ESTABLISHEDPATIENT:[T]] PROVIDER:[TOKEN:[[id number]],FOLLOWUP:[1-3 days],ESTABLISHEDPATIENT:[T]],PROVIDER:[TOKEN:[[id number]],FOLLOWUP:[1 week]],PROVIDER:[TOKEN:[[id number]],FOLLOWUP:[1 month]]

## 2022-02-07 NOTE — PROGRESS NOTE ADULT - SUBJECTIVE AND OBJECTIVE BOX
POST-OPERATIVE NOTE    Procedure: EVAR    Diagnosis/Indication: AAA    Surgeon: Santiago    S: Pt has no complaints. Denies CP, SOB, N/V. Pain controlled with medication.    O:  T(C): 37.1 (02-07-22 @ 17:11), Max: 37.1 (02-07-22 @ 17:11)  T(F): 98.7 (02-07-22 @ 17:11), Max: 98.7 (02-07-22 @ 17:11)  HR: 104 (02-07-22 @ 17:11) (100 - 104)  BP: 121/65 (02-07-22 @ 17:11) (119/80 - 121/65)  RR: 20 (02-07-22 @ 17:11) (18 - 23)  SpO2: 96% (02-07-22 @ 17:11) (96% - 99%)  Wt(kg): --                        12.1   7.12  )-----------( 187      ( 07 Feb 2022 12:49 )             38.4     02-07    141  |  107  |  15  ----------------------------<  127<H>  4.2   |  23  |  0.93    Ca    8.3<L>      07 Feb 2022 12:49  Phos  3.2     02-07  Mg     1.5     02-07        Gen: NAD, resting comfortably in bed  C/V: NSR  Pulm: Nonlabored breathing, no respiratory distress  Abd: soft, NT/ND  Extrem: b/l groin- soft no hematoma appreciated      A/P: 81yMale s/p above procedure  Diet: CLD  IVF  Pain/nausea control  d/c voss at midnight

## 2022-02-07 NOTE — PATIENT PROFILE ADULT - FALL HARM RISK - UNIVERSAL INTERVENTIONS
Bed in lowest position, wheels locked, appropriate side rails in place/Call bell, personal items and telephone in reach/Instruct patient to call for assistance before getting out of bed or chair/Non-slip footwear when patient is out of bed/[place] to call system/Physically safe environment - no spills, clutter or unnecessary equipment/Purposeful Proactive Rounding/Room/bathroom lighting operational, light cord in reach

## 2022-02-07 NOTE — H&P ADULT - ASSESSMENT
81 y/o M with a PMH of HTN, afib on eliquis (last take friday), smoking, interstitial lung diease, PAD s/p LLE stents with Dr. Zambrano found to have iAAA on CT in April 2020 measuring 4.3cm. iAAA grew to 5cm 6 months later on f/u scan. Patient now s/p EVAR.    - A 79 y/o M with a PMH of HTN, afib on eliquis (last take friday), smoking, interstitial lung diease, PAD s/p LLE stents with Dr. Zambrano found to have iAAA on CT in April 2020 measuring 4.3cm. iAAA grew to 5cm 6 months later on f/u scan. Patient now s/p EVAR.    - Admit to [facility] tele under Dr. Henderson  - Pain control with tylenol  - Holding eliquis for afib  - Home meds  - Clears until am  - d/c voss at midnight  - Flat for 2 hours, bedrest until am  - IVF  - Frequent pulse checks  - Ancef x 2 doses

## 2022-02-07 NOTE — BRIEF OPERATIVE NOTE - OPERATION/FINDINGS
Procedure: EVAR  Indication: Rapidly growing iAAA  Description: Bilateral groin access obtained under ultrasound and fluoroscopic guidance. Terumo Main Body 24x80 sheath placed via right groin. L iliac limb ***. Right iliac limb ***. Completion showed ***. Both groins perclosed x2 with Proglides  IVF:  Contrast:  EBL:  UOP:   Pulses Procedure: EVAR  Indication: Rapidly growing iAAA  Description: Bilateral groin access obtained under ultrasound and fluoroscopic guidance. Terumo Main Body 24x80 sheath placed via right groin. L iliac limb 13x100. Right iliac limb 13x100, Completion showed no endoleak. Both groins perclosed x2 with Proglides  IVF: 500  Contrast: 120cc  EBL: 50  UOP:   Pulses: triphasic dp/pt bilaterally

## 2022-02-08 ENCOUNTER — TRANSCRIPTION ENCOUNTER (OUTPATIENT)
Age: 82
End: 2022-02-08

## 2022-02-08 VITALS
DIASTOLIC BLOOD PRESSURE: 72 MMHG | SYSTOLIC BLOOD PRESSURE: 145 MMHG | HEART RATE: 102 BPM | OXYGEN SATURATION: 95 % | RESPIRATION RATE: 18 BRPM

## 2022-02-08 LAB
ANION GAP SERPL CALC-SCNC: 12 MMOL/L — SIGNIFICANT CHANGE UP (ref 5–17)
APPEARANCE UR: CLEAR — SIGNIFICANT CHANGE UP
BACTERIA # UR AUTO: PRESENT /HPF
BILIRUB UR-MCNC: NEGATIVE — SIGNIFICANT CHANGE UP
BUN SERPL-MCNC: 11 MG/DL — SIGNIFICANT CHANGE UP (ref 7–23)
CALCIUM SERPL-MCNC: 8.1 MG/DL — LOW (ref 8.4–10.5)
CHLORIDE SERPL-SCNC: 105 MMOL/L — SIGNIFICANT CHANGE UP (ref 96–108)
CO2 SERPL-SCNC: 21 MMOL/L — LOW (ref 22–31)
COLOR SPEC: YELLOW — SIGNIFICANT CHANGE UP
CREAT SERPL-MCNC: 0.84 MG/DL — SIGNIFICANT CHANGE UP (ref 0.5–1.3)
DIFF PNL FLD: ABNORMAL
EPI CELLS # UR: SIGNIFICANT CHANGE UP /HPF (ref 0–5)
GLUCOSE SERPL-MCNC: 119 MG/DL — HIGH (ref 70–99)
GLUCOSE UR QL: NEGATIVE — SIGNIFICANT CHANGE UP
HCT VFR BLD CALC: 36.5 % — LOW (ref 39–50)
HGB BLD-MCNC: 12.2 G/DL — LOW (ref 13–17)
KETONES UR-MCNC: NEGATIVE — SIGNIFICANT CHANGE UP
LEUKOCYTE ESTERASE UR-ACNC: ABNORMAL
MAGNESIUM SERPL-MCNC: 1.8 MG/DL — SIGNIFICANT CHANGE UP (ref 1.6–2.6)
MCHC RBC-ENTMCNC: 29.8 PG — SIGNIFICANT CHANGE UP (ref 27–34)
MCHC RBC-ENTMCNC: 33.4 GM/DL — SIGNIFICANT CHANGE UP (ref 32–36)
MCV RBC AUTO: 89 FL — SIGNIFICANT CHANGE UP (ref 80–100)
NITRITE UR-MCNC: NEGATIVE — SIGNIFICANT CHANGE UP
NRBC # BLD: 0 /100 WBCS — SIGNIFICANT CHANGE UP (ref 0–0)
PH UR: 7 — SIGNIFICANT CHANGE UP (ref 5–8)
PHOSPHATE SERPL-MCNC: 2.8 MG/DL — SIGNIFICANT CHANGE UP (ref 2.5–4.5)
PLATELET # BLD AUTO: 177 K/UL — SIGNIFICANT CHANGE UP (ref 150–400)
POTASSIUM SERPL-MCNC: 4 MMOL/L — SIGNIFICANT CHANGE UP (ref 3.5–5.3)
POTASSIUM SERPL-SCNC: 4 MMOL/L — SIGNIFICANT CHANGE UP (ref 3.5–5.3)
PROT UR-MCNC: NEGATIVE MG/DL — SIGNIFICANT CHANGE UP
RBC # BLD: 4.1 M/UL — LOW (ref 4.2–5.8)
RBC # FLD: 16.9 % — HIGH (ref 10.3–14.5)
RBC CASTS # UR COMP ASSIST: < 5 /HPF — SIGNIFICANT CHANGE UP
SODIUM SERPL-SCNC: 138 MMOL/L — SIGNIFICANT CHANGE UP (ref 135–145)
SP GR SPEC: 1.01 — SIGNIFICANT CHANGE UP (ref 1–1.03)
UROBILINOGEN FLD QL: 0.2 E.U./DL — SIGNIFICANT CHANGE UP
WBC # BLD: 9.91 K/UL — SIGNIFICANT CHANGE UP (ref 3.8–10.5)
WBC # FLD AUTO: 9.91 K/UL — SIGNIFICANT CHANGE UP (ref 3.8–10.5)
WBC UR QL: ABNORMAL /HPF

## 2022-02-08 PROCEDURE — C1887: CPT

## 2022-02-08 PROCEDURE — 81001 URINALYSIS AUTO W/SCOPE: CPT

## 2022-02-08 PROCEDURE — 93005 ELECTROCARDIOGRAM TRACING: CPT

## 2022-02-08 PROCEDURE — 85610 PROTHROMBIN TIME: CPT

## 2022-02-08 PROCEDURE — 85730 THROMBOPLASTIN TIME PARTIAL: CPT

## 2022-02-08 PROCEDURE — C1760: CPT

## 2022-02-08 PROCEDURE — C1725: CPT

## 2022-02-08 PROCEDURE — C1769: CPT

## 2022-02-08 PROCEDURE — 99221 1ST HOSP IP/OBS SF/LOW 40: CPT

## 2022-02-08 PROCEDURE — 82565 ASSAY OF CREATININE: CPT

## 2022-02-08 PROCEDURE — 76000 FLUOROSCOPY <1 HR PHYS/QHP: CPT

## 2022-02-08 PROCEDURE — 71045 X-RAY EXAM CHEST 1 VIEW: CPT

## 2022-02-08 PROCEDURE — 36415 COLL VENOUS BLD VENIPUNCTURE: CPT

## 2022-02-08 PROCEDURE — C2628: CPT

## 2022-02-08 PROCEDURE — 99223 1ST HOSP IP/OBS HIGH 75: CPT

## 2022-02-08 PROCEDURE — 80048 BASIC METABOLIC PNL TOTAL CA: CPT

## 2022-02-08 PROCEDURE — 83735 ASSAY OF MAGNESIUM: CPT

## 2022-02-08 PROCEDURE — C1874: CPT

## 2022-02-08 PROCEDURE — 86901 BLOOD TYPING SEROLOGIC RH(D): CPT

## 2022-02-08 PROCEDURE — 85025 COMPLETE CBC W/AUTO DIFF WBC: CPT

## 2022-02-08 PROCEDURE — 86900 BLOOD TYPING SEROLOGIC ABO: CPT

## 2022-02-08 PROCEDURE — 85027 COMPLETE CBC AUTOMATED: CPT

## 2022-02-08 PROCEDURE — 71045 X-RAY EXAM CHEST 1 VIEW: CPT | Mod: 26

## 2022-02-08 PROCEDURE — 86850 RBC ANTIBODY SCREEN: CPT

## 2022-02-08 PROCEDURE — 84100 ASSAY OF PHOSPHORUS: CPT

## 2022-02-08 PROCEDURE — C1894: CPT

## 2022-02-08 RX ORDER — METOPROLOL TARTRATE 50 MG
5 TABLET ORAL ONCE
Refills: 0 | Status: COMPLETED | OUTPATIENT
Start: 2022-02-08 | End: 2022-02-08

## 2022-02-08 RX ORDER — ATORVASTATIN CALCIUM 80 MG/1
1 TABLET, FILM COATED ORAL
Qty: 30 | Refills: 0
Start: 2022-02-08 | End: 2022-03-09

## 2022-02-08 RX ORDER — ATORVASTATIN CALCIUM 80 MG/1
80 TABLET, FILM COATED ORAL AT BEDTIME
Refills: 0 | Status: DISCONTINUED | OUTPATIENT
Start: 2022-02-08 | End: 2022-02-08

## 2022-02-08 RX ORDER — HYDROMORPHONE HYDROCHLORIDE 2 MG/ML
0.5 INJECTION INTRAMUSCULAR; INTRAVENOUS; SUBCUTANEOUS ONCE
Refills: 0 | Status: DISCONTINUED | OUTPATIENT
Start: 2022-02-08 | End: 2022-02-08

## 2022-02-08 RX ORDER — ACETAMINOPHEN 500 MG
650 TABLET ORAL ONCE
Refills: 0 | Status: COMPLETED | OUTPATIENT
Start: 2022-02-08 | End: 2022-02-08

## 2022-02-08 RX ADMIN — AMLODIPINE BESYLATE 10 MILLIGRAM(S): 2.5 TABLET ORAL at 06:05

## 2022-02-08 RX ADMIN — HEPARIN SODIUM 5000 UNIT(S): 5000 INJECTION INTRAVENOUS; SUBCUTANEOUS at 13:41

## 2022-02-08 RX ADMIN — Medication 325 MILLIGRAM(S): at 17:16

## 2022-02-08 RX ADMIN — HYDROMORPHONE HYDROCHLORIDE 0.5 MILLIGRAM(S): 2 INJECTION INTRAMUSCULAR; INTRAVENOUS; SUBCUTANEOUS at 06:30

## 2022-02-08 RX ADMIN — Medication 100 MILLIGRAM(S): at 02:31

## 2022-02-08 RX ADMIN — HYDROMORPHONE HYDROCHLORIDE 0.5 MILLIGRAM(S): 2 INJECTION INTRAMUSCULAR; INTRAVENOUS; SUBCUTANEOUS at 05:51

## 2022-02-08 RX ADMIN — Medication 25 MILLIGRAM(S): at 06:05

## 2022-02-08 RX ADMIN — Medication 1 MILLIGRAM(S): at 12:52

## 2022-02-08 RX ADMIN — PANTOPRAZOLE SODIUM 40 MILLIGRAM(S): 20 TABLET, DELAYED RELEASE ORAL at 12:51

## 2022-02-08 RX ADMIN — Medication 1000 UNIT(S): at 12:52

## 2022-02-08 RX ADMIN — Medication 5 MILLIGRAM(S): at 02:55

## 2022-02-08 RX ADMIN — Medication 650 MILLIGRAM(S): at 06:30

## 2022-02-08 RX ADMIN — CLOPIDOGREL BISULFATE 75 MILLIGRAM(S): 75 TABLET, FILM COATED ORAL at 12:52

## 2022-02-08 RX ADMIN — Medication 325 MILLIGRAM(S): at 17:29

## 2022-02-08 RX ADMIN — Medication 650 MILLIGRAM(S): at 05:49

## 2022-02-08 NOTE — DISCHARGE NOTE PROVIDER - NSDCFUADDINST_GEN_ALL_CORE_FT
FOLLOW UP:  Dr. Henderson in 2 weeks. Your appointment has been made for 2/24/2022 at 10:00AM. Call the office at  with any questions.    WOUND CARE:   You may shower; soap and water over incision sites. Do not scrub. Pat dry when done.     ACTIVITY:  Ambulate as tolerated, but no heavy lifting (>10lbs) or strenuous exercise.    DIET:   You may resume your regular diet.     Call the office if you experience increasing pain, redness, swelling or drainage from incision sites/wounds, or temperature >101.4F.     NEW MEDICATIONS:  Please take Lipitor 80mg once a day. Prescription sent to [facility] at [facility].    ADDITIONAL FOLLOW UP AFTER DISCHARGE:    DISCHARGE DESTINATION:  Home   FOLLOW UP:  Dr. Henderson in 2 weeks. Your appointment has been made for 2/24/2022 at 10:00AM. Call the office at  with any questions.    WOUND CARE:   You may shower; soap and water over incision sites. Do not scrub. Pat dry when done.     ACTIVITY:  Ambulate as tolerated, but no heavy lifting (>10lbs) or strenuous exercise.    DIET:   You may resume your regular diet.     Call the office if you experience increasing pain, redness, swelling or drainage from incision sites/wounds, or temperature >101.4F.     NEW MEDICATIONS:  Please take Lipitor 80mg once a day.   Please take Bactrim 800/160mg twice a day for seven days.  Prescription sent to [facility] at [facility].    ADDITIONAL FOLLOW UP AFTER DISCHARGE:  Please follow up with [facility] on 3/2/2022 for further management of your cardiac issues.     DISCHARGE DESTINATION:  Home   FOLLOW UP:  Dr. Henderson in 2 weeks. Your appointment has been made for 2/24/2022 at 10:00AM. Call the office at  with any questions.    WOUND CARE:   You may shower; soap and water over incision sites. Do not scrub. Pat dry when done.     ACTIVITY:  Ambulate as tolerated, but no heavy lifting (>10lbs) or strenuous exercise.    DIET:   You may resume your regular diet.     Call the office if you experience increasing pain, redness, swelling or drainage from incision sites/wounds, or temperature >101.4F.     NEW MEDICATIONS:  Please take Lipitor 80mg once a day.   Please take Bactrim 800/160mg twice a day for seven days.  Prescription sent to [facility].    ADDITIONAL FOLLOW UP AFTER DISCHARGE:  Please follow up with [facility] on 3/2/2022 for further management of your cardiac issues.     DISCHARGE DESTINATION:  Home

## 2022-02-08 NOTE — CONSULT NOTE ADULT - ATTENDING COMMENTS
Initial attending contact date  2/8/22    . See fellow note written above for details. I reviewed the fellow documentation. I have personally seen and examined this patient. I reviewed vitals, labs, medications, cardiac studies, and additional imaging. I agree with the above fellow's findings and plans as written above with the following additions/statements.    81 yo M PMHx of HTN, Afib (on eliquis), ILD, PAD s/p stents admitted for elective repair of iAAA. He is now s/p EVAR complicated by Afib w/RVR  -On tele with Afib with -110 in setting of fever 101  -RVR in setting of febrile state   -cont home toprol 25mg qd   -Resume home eliquis when able

## 2022-02-08 NOTE — DISCHARGE NOTE PROVIDER - CARE PROVIDER_API CALL
Mitzy Henderson)  [facility]  Vascular  [address]  [place], [place] [address]  Phone: [phone]  Fax: [phone]  Follow Up Time:

## 2022-02-08 NOTE — CONSULT NOTE ADULT - ASSESSMENT
81 yo M PMHx of HTN, Afib (on eliquis), ILD, PAD s/p stents admitted for elective repair of iAAA. He is now s/p EVAR complicated by Afib w/RVR    #Atrial Fibrillation  Rates poorly controlled    [id number]: 4  Poor rate controlled likely due to recent procedure and late administration of Toprol   - c/w with Toprol XL 25 mg q day  - lopressor 5mg IV prn for HR >120   - Pain control, would caution against standing IVFs   - resume Eliquis when safe from a surgical standpoint.     Recommendations final when signed by an attending.     Pelon Holcomb MD [facility]  Cardiovascular Disease Fellow, PGY-4  79 yo M PMHx of HTN, Afib (on eliquis), ILD, PAD s/p stents admitted for elective repair of iAAA. He is now s/p EVAR complicated by Afib w/RVR    #Atrial Fibrillation  Rates poorly controlled    [id number]: 4  Poor rate controlled likely due to recent procedure and late administration of Toprol   - c/w with Toprol XL 25 mg q day  - lopressor 5mg IV prn for HR >120   - Pain control, would caution against standing IVFs   Would investigate other potential causes of tachycardia including fever/infection   - resume Eliquis when safe from a surgical standpoint.     Recommendations final when signed by an attending.     Pelon Holcomb MD [facility]  Cardiovascular Disease Fellow, PGY-4

## 2022-02-08 NOTE — DISCHARGE NOTE PROVIDER - NSDCFUSCHEDAPPT_GEN_ALL_CORE_FT
LIV PRETTY ; 03/02/2022 ; [facility] [address]  LIV PRETTY ; 05/06/2022 ; [address] LIV PRETTY ; 02/24/2022 ; [facility] [address]  LIV PRETTY ; 03/02/2022 ; [facility] [address] [facility]  LIV PRETTY ; 05/06/2022 ; [facility] [facility]

## 2022-02-08 NOTE — DISCHARGE NOTE PROVIDER - NSDCMRMEDTOKEN_GEN_ALL_CORE_FT
allopurinol 100 mg oral tablet: 1 tab(s) orally 2 times a day, As Needed  amLODIPine 10 mg oral tablet: 1 tab(s) orally once a day  atorvastatin 80 mg oral tablet: 1 tab(s) orally once a day   clopidogrel 75 mg oral tablet: 1 tab(s) orally once a day  Eliquis 5 mg oral tablet: 1 tab(s) orally 2 times a day  folic acid 1 mg oral tablet: 1 tab(s) orally once a day  gemfibrozil 600 mg oral tablet: 1 tab(s) orally 2 times a day  Linzess 145 mcg oral capsule: 1 cap(s) orally once a day, As Needed  omeprazole 40 mg oral delayed release capsule: 1 cap(s) orally once a day  Toprol-XL 25 mg oral tablet, extended release: 1 tab(s) orally once a day  Vitamin D3 25 mcg (1000 intl units) oral tablet: 1 tab(s) orally once a day   allopurinol 100 mg oral tablet: 1 tab(s) orally 2 times a day, As Needed  amLODIPine 10 mg oral tablet: 1 tab(s) orally once a day  atorvastatin 80 mg oral tablet: 1 tab(s) orally once a day   clopidogrel 75 mg oral tablet: 1 tab(s) orally once a day  Eliquis 5 mg oral tablet: 1 tab(s) orally 2 times a day  folic acid 1 mg oral tablet: 1 tab(s) orally once a day  gemfibrozil 600 mg oral tablet: 1 tab(s) orally 2 times a day  Linzess 145 mcg oral capsule: 1 cap(s) orally once a day, As Needed  omeprazole 40 mg oral delayed release capsule: 1 cap(s) orally once a day  sulfamethoxazole-trimethoprim 800 mg-160 mg oral tablet: 1 tab(s) orally 2 times a day   Toprol-XL 25 mg oral tablet, extended release: 1 tab(s) orally once a day  Vitamin D3 25 mcg (1000 intl units) oral tablet: 1 tab(s) orally once a day

## 2022-02-08 NOTE — DISCHARGE NOTE PROVIDER - HOSPITAL COURSE
79 y/o M with a PMH of HTN, AFib on Eliquis (last take Friday 2/4), smoking, interstitial lung diease, PAD s/p LLE stents with Dr. Zambrano found to have iAAA on CT in April 2020 measuring 4.3cm. iAAA grew to 5cm 6 months later on f/u scan. Patient presented on 2/7 for elective repair of iAAA. Pt also reported RLE 1 block claudication, but denied CP, SOB, abd pain, back pain, fevers, chills on admission. Pt underwent EVAR on 2/7 and there were no intraoperative complications. His post-operative course was c/b an episode of afib with RVR for which cardiology was consulted. It was recommended that he continue his Toprol XL 25mg qD and lopressor 5mg IV prn for HR>120.  On the morning of POD1, pt had a Tmax of 101.0 which was worked up with CXR and U/A for presumed post-op fever. CXR showed no acute abnormalities and U/A was ______________. The rest of the patient's hospital course was unremarkable. He passed his TOV, both of his groin sites were soft, without evidence of bleeding, He was able to tolerate a diet and was OOB. At time of discharge, pt was afebrile with a normal WBC, his pain was well controlled, he was HDS, and deemed clinically stable for discharge home with planned follow up with Dr. Henderson on 2/24/2022. 79 y/o M with a PMH of HTN, AFib on Eliquis (last take Friday 2/4), smoking, interstitial lung diease, PAD s/p LLE stents with Dr. Zambrano found to have iAAA on CT in April 2020 measuring 4.3cm. iAAA grew to 5cm 6 months later on f/u scan. Patient presented on 2/7 for elective repair of iAAA. Pt also reported RLE 1 block claudication, but denied CP, SOB, abd pain, back pain, fevers, chills on admission. Pt underwent EVAR on 2/7 and there were no intraoperative complications. His post-operative course was c/b an episode of afib with RVR for which cardiology was consulted. It was recommended that he continue his Toprol XL 25mg qD and lopressor 5mg IV prn for HR>120.  On the morning of POD1, pt had a Tmax of 101.0 which was worked up with CXR and U/A for presumed post-op fever. The rest of the patient's hospital course was unremarkable. He passed his TOV, both of his groin sites were soft, without evidence of bleeding, He was able to tolerate a diet and was OOB. At time of discharge, pt was afebrile with a normal WBC, his pain was well controlled, he was HDS, and deemed clinically stable for discharge home with planned follow up with Dr. Henderson on 2/24/2022.

## 2022-02-08 NOTE — DISCHARGE NOTE PROVIDER - NSDCCPTREATMENT_GEN_ALL_CORE_FT
PRINCIPAL PROCEDURE  Procedure: Endovascular repair of infrarenal abdominal aorta and iliac arteries on both sides of pelvis  Findings and Treatment:

## 2022-02-08 NOTE — DISCHARGE NOTE NURSING/CASE MANAGEMENT/SOCIAL WORK - PATIENT PORTAL LINK FT
You can access the FollowMyHealth Patient Portal offered by [facility] by registering at the following website: [email or web address]. By joining [facility]’s FollowMyHealth portal, you will also be able to view your health information using other applications (apps) compatible with our system.

## 2022-02-08 NOTE — DISCHARGE NOTE PROVIDER - NSDCCPCAREPLAN_GEN_ALL_CORE_FT
PRINCIPAL DISCHARGE DIAGNOSIS  Diagnosis: AAA (abdominal aortic aneurysm)  Assessment and Plan of Treatment:       SECONDARY DISCHARGE DIAGNOSES  Diagnosis: Iron deficiency anemia  Assessment and Plan of Treatment:     Diagnosis: Diabetes  Assessment and Plan of Treatment:     Diagnosis: HLD (hyperlipidemia)  Assessment and Plan of Treatment:     Diagnosis: HTN (hypertension)  Assessment and Plan of Treatment:     Diagnosis: Chronic obstructive pulmonary disease, unspecified COPD type  Assessment and Plan of Treatment:     Diagnosis: Chronic heart failure with preserved ejection fraction (HFpEF)  Assessment and Plan of Treatment:     Diagnosis: Carotid bruit  Assessment and Plan of Treatment:     Diagnosis: Chronic atrial fibrillation  Assessment and Plan of Treatment:

## 2022-02-08 NOTE — CONSULT NOTE ADULT - ASSESSMENT
In summary, this is an 81yo gentleman, former smoker, with a PMH of COPD, HTN, chronic Afib (on Eliquis), PAD s/p LLE stents and infrarenal AAA who presented for an elective repair on 2/7.  POD #1 notable for Afib with RVR and fever.      #Post-Op Fever  -- f/u U/A  -- f/u official CXR; however, no clinical signs of PNA     #AAA s/p EVAR  -- clinically doing well   -- on Plavix per primary team   -- c/w Atorvastatin 80mg QHS     #Chronic afib c/b RVR  -- c/w home Toprol   -- defer restarting Eliquis to primary team     #Essential HTN  -- c/w home Amlodipine     #Diet - DASH  #DVT PPx - SQH   #Dispo - home once medically cleared     Gabriella Hernandez  Attending Hospitalist  [phone]

## 2022-02-08 NOTE — CONSULT NOTE ADULT - SUBJECTIVE AND OBJECTIVE BOX
Vascular Co-Management Initial Consult Note    HPI:    This is an 81yo gentleman, former smoker, with a PMH of COPD, HTN, chronic Afib (on Eliquis), PAD s/p LLE stents and infrarenal AAA who presented for an elective repair on 2/7.  He went to the OR yesterday with no intra-op or immediate post-op complications.  Overnight he did have an episode of Afib with RVR likely in the setting of a missed beta-blocker dose and post-op fever.  This morning he reports having felt his fever but denied any palpitation.  He did have a headache overnight associated w/lower quadrant pain and back pain, as well as missing dinner.  This AM he hasn't been given his tray again and expressed hunger and frustration.  ROS at the time of my interview this AM negative for - CP, SOB, palpitations, orthopnea, PND, HA, abdominal pain, urinary retention and BLE weakness, pain or changes in cessation.  He does report mild burning right after the Dunbar was removed but none this AM.  Passed his TOV.      PAST MEDICAL & SURGICAL HISTORY:  Chronic atrial fibrillation  Carotid bruit  Chronic heart failure with preserved ejection fraction (HFpEF)  COPD (chronic obstructive pulmonary disease)  HTN (hypertension)  HLD (hyperlipidemia)  Diabetes  Iron deficiency anemia  S/P cholecystectomy  H/O knee surgery, Right    Home Medications:  allopurinol 100 mg oral tablet: 1 tab(s) orally 2 times a day, As Needed (20 Oct 2021 15:23)  folic acid 1 mg oral tablet: 1 tab(s) orally once a day (20 Oct 2021 15:23)  Linzess 145 mcg oral capsule: 1 cap(s) orally once a day, As Needed (20 Oct 2021 15:23)  omeprazole 40 mg oral delayed release capsule: 1 cap(s) orally once a day (20 Oct 2021 15:23)  Vitamin D3 25 mcg (1000 intl units) oral tablet: 1 tab(s) orally once a day (20 Oct 2021 15:23)    Allergies  No Known Allergies    FAMILY HISTORY:  No pertinent family history in first degree relatives    Social History:  Lives in [place] with wife but lately has been staying with his daughter.   Independent with ADL's and iADL's.   Former smoker; denies other toxic habits    CURRENT MEDICATIONS:   acetaminophen     Tablet .. 325 milliGRAM(s) Oral every 4 hours PRN  amLODIPine   Tablet 10 milliGRAM(s) Oral daily  atorvastatin 40 milliGRAM(s) Oral daily  chlorhexidine 4% Liquid 1 Application(s) Topical once  cholecalciferol 1000 Unit(s) Oral daily  clopidogrel Tablet 75 milliGRAM(s) Oral daily  folic acid 1 milliGRAM(s) Oral daily  heparin   Injectable 5000 Unit(s) SubCutaneous every 8 hours  influenza  Vaccine (HIGH DOSE) 0.7 milliLiter(s) IntraMuscular once  metoprolol succinate ER 25 milliGRAM(s) Oral daily  pantoprazole    Tablet 40 milliGRAM(s) Oral daily      VITAL SIGNS, INS/OUTS (last 24 hours):  Vital Signs Last 24 Hrs  T(C): 37.5 (08 Feb 2022 09:39), Max: 38.3 (08 Feb 2022 05:26)  T(F): 99.5 (08 Feb 2022 09:39), Max: 101 (08 Feb 2022 05:26)  HR: 103 (08 Feb 2022 08:36) (100 - 129)  BP: 121/62 (08 Feb 2022 08:36) (114/64 - 150/84)  BP(mean): 85 (07 Feb 2022 17:11) (76 - 106)  RR: 18 (08 Feb 2022 08:36) (14 - 25)  SpO2: 96% (08 Feb 2022 08:36) (94% - 100%)  I&O's Summary    07 Feb 2022 07:01  -  08 Feb 2022 07:00  --------------------------------------------------------  IN: 1060 mL / OUT: 1825 mL / NET: -765 mL    08 Feb 2022 07:01  -  08 Feb 2022 12:29  --------------------------------------------------------  IN: 0 mL / OUT: 200 mL / NET: -200 mL    EXAM:  Gen: NAD, sitting upright in bed  HEENT: NCAT, MMM, clear OP  Neck: supple, trachea at midline  CV: RRR, no m/g/r appreciated  Pulm: CTA B, no w/r/r; no increase in WOB  Abd: normoactive BS, soft, NTND  Ext: WWP, 2+ pulses x4; no c/c/e  Neuro: AOx3, nonfocal  Psych: pleasant, conversant and appropriate     BASIC LABS:                        12.2   9.91  )-----------( 177      ( 08 Feb 2022 07:29 )             36.5     02-08    138  |  105  |  11  ----------------------------<  119<H>  4.0   |  21<L>  |  0.84    Ca    8.1<L>      08 Feb 2022 07:29  Phos  2.8     02-08  Mg     1.8     02-08    PT/INR - ( 07 Feb 2022 09:24 )   PT: 11.8 sec;   INR: 0.98     PTT - ( 07 Feb 2022 09:24 )  PTT:27.9 sec    MICRODATA:  No new microdata.     IMAGING:  CXR (2/8, my read) - no gross consolidation or effusion but mild (potentially chronic) perihilar opacities   
MD DRE DiazA   Cardiology Fellow    Pager [phone]     HPI:    81 y/o M with a PMH of HTN, afib on eliquis (last take friday), smoking, interstitial lung diease, PAD s/p LLE stents with Dr. Zambrano found to have iAAA on CT in April 2020 measuring 4.3cm. iAAA grew to 5cm 6 months later on f/u scan. Patient presenting for elective repair of iAAA. He also does have RLE 1 block claudication as well. No c/p, SOB, abd pain, back pain, fevers, chills. He is now s/p Endovascular repair of infrarenal abdominal aorta and iliac arteries on both sides of pelvis complicated by Afib with RVR for which cardiology was consulted. He denies chest pain, shortness of breath, and palpitations. He does report 3/10 pain in his right groin.     PAST MEDICAL & SURGICAL HISTORY:  Chronic atrial fibrillation  Carotid bruit  Chronic heart failure with preserved ejection fraction (HFpEF)  COPD (chronic obstructive pulmonary disease)  HTN (hypertension)  HLD (hyperlipidemia)  Diabetes  Iron deficiency anemia  S/P cholecystectomy  H/O knee surgery  Right    SOCIAL HISTORY:  +tobacco use    Allergies  No Known Allergies    HOME MEDICATIONS:  allopurinol 100 mg oral tablet: 1 tab(s) orally 2 times a day, As Needed (20 Oct 2021 15:23)  folic acid 1 mg oral tablet: 1 tab(s) orally once a day (20 Oct 2021 15:23)  Linzess 145 mcg oral capsule: 1 cap(s) orally once a day, As Needed (20 Oct 2021 15:23)  omeprazole 40 mg oral delayed release capsule: 1 cap(s) orally once a day (20 Oct 2021 15:23)  Vitamin D3 25 mcg (1000 intl units) oral tablet: 1 tab(s) orally once a day (20 Oct 2021 15:23)    Vital Signs Last 24 Hrs  T(C): 36.8 (07 Feb 2022 22:26), Max: 37.1 (07 Feb 2022 17:11)  T(F): 98.3 (07 Feb 2022 22:26), Max: 98.7 (07 Feb 2022 17:11)  HR: 106 (07 Feb 2022 20:30) (98 - 115)  BP: 116/71 (07 Feb 2022 20:30) (114/64 - 150/84)  BP(mean): 85 (07 Feb 2022 17:11) (76 - 106)  RR: 18 (07 Feb 2022 20:30) (14 - 25)  SpO2: 97% (07 Feb 2022 20:30) (94% - 100%)    I&O's Summary    07 Feb 2022 07:01  -  08 Feb 2022 01:01  --------------------------------------------------------  IN: 580 mL / OUT: 1350 mL / NET: -770 mL    PHYSICAL EXAM:  GENERAL: NAD  HEAD:  Atraumatic, Normocephalic  EYES: EOMI, conjunctiva and sclera clear  NECK: Supple, No JVD  CHEST/LUNG: Clear to auscultation bilaterally;  HEART: Regular rate and rhythm; No murmurs  ABDOMEN: Soft, Nontender, Nondistended; Bowel sounds present  EXTREMITIES: No lower extremity edema    LABS                        12.1   7.12  )-----------( 187      ( 07 Feb 2022 12:49 )             38.4                         13.7   7.60  )-----------( 258      ( 07 Feb 2022 09:24 )             42.1     02-07    141  |  107  |  15  ----------------------------<  127<H>  4.2   |  23  |  0.93  02-07    142  |  104  |  15  ----------------------------<  119<H>  4.2   |  27  |  1.03    Ca    8.3<L>      07 Feb 2022 12:49  Ca    9.5      07 Feb 2022 09:24  Phos  3.2     02-07  Mg     1.5     02-07    PT/INR - ( 07 Feb 2022 09:24 )   PT: 11.8 sec;   INR: 0.98     PTT - ( 07 Feb 2022 09:24 )  PTT:27.9 sec    MEDICATIONS  (STANDING):  amLODIPine   Tablet 10 milliGRAM(s) Oral daily  atorvastatin 40 milliGRAM(s) Oral daily  ceFAZolin   IVPB 1000 milliGRAM(s) IV Intermittent every 8 hours  chlorhexidine 4% Liquid 1 Application(s) Topical once  cholecalciferol 1000 Unit(s) Oral daily  clopidogrel Tablet 75 milliGRAM(s) Oral daily  folic acid 1 milliGRAM(s) Oral daily  heparin   Injectable 5000 Unit(s) SubCutaneous every 8 hours  influenza  Vaccine (HIGH DOSE) 0.7 milliLiter(s) IntraMuscular once  metoprolol succinate ER 25 milliGRAM(s) Oral daily  pantoprazole    Tablet 40 milliGRAM(s) Oral daily    MEDICATIONS  (PRN):  acetaminophen     Tablet .. 325 milliGRAM(s) Oral every 4 hours PRN Mild Pain (1 - 3)    ECG: Atrial Fibrillation

## 2022-02-08 NOTE — DISCHARGE NOTE NURSING/CASE MANAGEMENT/SOCIAL WORK - NSDCPEFALRISK_GEN_ALL_CORE
For information on Fall & Injury Prevention, visit: [email or web address] OR  [email or web address] OR  https://www.cdc.gov/steadi/patient.html

## 2022-02-08 NOTE — PROGRESS NOTE ADULT - SUBJECTIVE AND OBJECTIVE BOX
O/N: - metoprolol 5mg 2x   2/7: s/p GUILLERMO LOPEZ with tachycardia in afin, groin checks normal, cards consulted for persistent tachycardia,                                                         ---------------------------------------------------------------------------  PLEASE CHECK WHEN PRESENT:  [  ] Heart Failure  [  ] Acute  [  ] Acute on Chronic  [x ] Chronic  -------------------------------------------------------------------  [  ]Diastolic [HFpEF]  [  ]Systolic [HFrEF]  [  ]Combined [HFpEF & HFrEF]  [  ] afib  [ x ] hypertensive heart disease  [  ]Other:  -------------------------------------------------------------------  [ ] Respiratory failure  [ ] Acute cor pulmonale  [ x ] Asthma/COPD Exacerbation  [ ] Pleural effusion  [ ] Aspiration pneumonia  -------------------------------------------------------------------  [  ]MELINDA  [  ]ATN  [  ]Reneal Medullary Necrosis  [  ]Renal Cortical Necrosis  [  ]Other Pathological Lesions:    [  ]CKD 1  [  ]CKD 2  [  ]CKD 3  [  ]CKD 4  [  ]CKD 5  [  ]Other  -------------------------------------------------------------------  [ x ]Diabetes  [  ] Diabetic PVD Ulcer  [  ] Neuropathic ulcer to DM  [  ] Diabetes with Nephropathy  [  ] Osteomyelitis due to diabetes  --------------------------------------------------------------------  [  ]Malnutrition: See Nutrition Note  [  ]Cachexia  [  ]Other:   [  ]Supplement Ordered:  [  ]Morbid Obesity (BMI >=40]  ---------------------------------------------------------------------  [ ] Sepsis/severe sepsis/septic shock  [ ] UTI  [ ] Pneumonia  -----------------------------------------------------------------------  [ ] Acidosis/alkalosis  [ ] Fluid overload  [ ] Hypokalemia  [ ] Hyperkalemia  [ ] Hypomagnesemia  [ ] Hypophosphatemia  [ ] Hyperphosphatemia  ------------------------------------------------------------------------  [ ] Acute blood loss anemia  [ ] Post op blood loss anemia  [x ] Iron deficiency anemia  [ ] Anemia due to chronic disease  [ ] Hypercoagulable state  ----------------------------------------------------------------------  [ ] Cerebral infarction  [ ] Transient ischemia attack  [ ] Encephalopathy      A/P: 81y M with a PMH of HTN, afib on eliquis (last take friday), smoking, interstitial lung diease, PAD s/p LLE stents with Dr. Zambrano found to have iAAA on CT in April 2020 measuring 4.3cm. iAAA grew to 5cm 6 months later on f/u scan. Patient presenting for elective repair of iAAA      Vascular/PAD:   - plavix,   - s/p EVAR 2/7         HTN/HLD:  -resume home meds   - a fib rvr / tahycadia - given metoprolol 5mg 2x ovn,   - f/u with cards recs       DM:  - ISS      Diet:   CLD     Activity: bed rest 24 hrs.     DVTPPx: sqh     Dispo: 5 uris          O/N: - metoprolol 5mg 2x, fever 101F, sent blood culture.   2/7: s/p GUILLERMO LOPEZ with tachycardia in afin, groin checks normal, cards consulted for persistent tachycardia,                                                         ---------------------------------------------------------------------------  PLEASE CHECK WHEN PRESENT:  [  ] Heart Failure  [  ] Acute  [  ] Acute on Chronic  [x ] Chronic  -------------------------------------------------------------------  [  ]Diastolic [HFpEF]  [  ]Systolic [HFrEF]  [  ]Combined [HFpEF & HFrEF]  [  ] afib  [ x ] hypertensive heart disease  [  ]Other:  -------------------------------------------------------------------  [ ] Respiratory failure  [ ] Acute cor pulmonale  [ x ] Asthma/COPD Exacerbation  [ ] Pleural effusion  [ ] Aspiration pneumonia  -------------------------------------------------------------------  [  ]MELINDA  [  ]ATN  [  ]Reneal Medullary Necrosis  [  ]Renal Cortical Necrosis  [  ]Other Pathological Lesions:    [  ]CKD 1  [  ]CKD 2  [  ]CKD 3  [  ]CKD 4  [  ]CKD 5  [  ]Other  -------------------------------------------------------------------  [ x ]Diabetes  [  ] Diabetic PVD Ulcer  [  ] Neuropathic ulcer to DM  [  ] Diabetes with Nephropathy  [  ] Osteomyelitis due to diabetes  --------------------------------------------------------------------  [  ]Malnutrition: See Nutrition Note  [  ]Cachexia  [  ]Other:   [  ]Supplement Ordered:  [  ]Morbid Obesity (BMI >=40]  ---------------------------------------------------------------------  [ ] Sepsis/severe sepsis/septic shock  [ ] UTI  [ ] Pneumonia  -----------------------------------------------------------------------  [ ] Acidosis/alkalosis  [ ] Fluid overload  [ ] Hypokalemia  [ ] Hyperkalemia  [ ] Hypomagnesemia  [ ] Hypophosphatemia  [ ] Hyperphosphatemia  ------------------------------------------------------------------------  [ ] Acute blood loss anemia  [ ] Post op blood loss anemia  [x ] Iron deficiency anemia  [ ] Anemia due to chronic disease  [ ] Hypercoagulable state  ----------------------------------------------------------------------  [ ] Cerebral infarction  [ ] Transient ischemia attack  [ ] Encephalopathy      A/P: 81y M with a PMH of HTN, afib on eliquis (last take friday), smoking, interstitial lung diease, PAD s/p LLE stents with Dr. Zambrano found to have iAAA on CT in April 2020 measuring 4.3cm. iAAA grew to 5cm 6 months later on f/u scan. Patient presenting for elective repair of iAAA      Vascular/PAD:   - plavix,   - s/p EVAR 2/7         HTN/HLD:  -resume home meds   - a fib rvr / tahycadia - given metoprolol 5mg 2x ovn,   - f/u with cards recs       DM:  - ISS      Diet:   CLD     Activity: bed rest 24 hrs.     DVTPPx: sqh     Dispo: 5 uris          O/N: - metoprolol 5mg 2x, fever 101F, sent blood culture.   2/7: s/p EVAR, POC with tachycardia in afin, groin checks normal, cards consulted for persistent tachycardia,     Subjective: Pt seen and examined at bedside. Reported some lower back and flank pain earlier this morning which has since resolved. Urinating appropriately. Denies flatus or BMs since surgery.     ROS:   Denies Headache, blurred vision, Chest Pain, SOB, Abdominal pain, nausea or vomiting     Social   amLODIPine   Tablet 10  clopidogrel Tablet 75  heparin   Injectable 5000  metoprolol succinate ER 25      Allergies    No Known Allergies    Intolerances        Vital Signs Last 24 Hrs  T(C): 38.3 (08 Feb 2022 05:26), Max: 38.3 (08 Feb 2022 05:26)  T(F): 101 (08 Feb 2022 05:26), Max: 101 (08 Feb 2022 05:26)  HR: 113 (08 Feb 2022 05:26) (98 - 129)  BP: 135/88 (08 Feb 2022 05:26) (114/64 - 150/84)  BP(mean): 85 (07 Feb 2022 17:11) (76 - 106)  RR: 18 (08 Feb 2022 05:26) (14 - 25)  SpO2: 96% (08 Feb 2022 05:26) (94% - 100%)  I&O's Summary    07 Feb 2022 07:01  -  08 Feb 2022 07:00  --------------------------------------------------------  IN: 580 mL / OUT: 1625 mL / NET: -1045 mL        Physical Exam:  Gen: NAD, resting comfortably in bed  C/V: NSR  Pulm: Nonlabored breathing, no respiratory distress  Abd: soft, NT/ND  Extrem: b/l groin- soft no evidence of bleeding or hematoma noted    LABS:                        12.1   7.12  )-----------( 187      ( 07 Feb 2022 12:49 )             38.4     02-07    141  |  107  |  15  ----------------------------<  127<H>  4.2   |  23  |  0.93    Ca    8.3<L>      07 Feb 2022 12:49  Phos  3.2     02-07  Mg     1.5     02-07      PT/INR - ( 07 Feb 2022 09:24 )   PT: 11.8 sec;   INR: 0.98          PTT - ( 07 Feb 2022 09:24 )  PTT:27.9 sec    Radiology and Additional Studies:      ---------------------------------------------------------------------------  PLEASE CHECK WHEN PRESENT:  [  ] Heart Failure  [  ] Acute  [  ] Acute on Chronic  [x ] Chronic  -------------------------------------------------------------------  [  ]Diastolic [HFpEF]  [  ]Systolic [HFrEF]  [  ]Combined [HFpEF & HFrEF]  [  ] afib  [ x ] hypertensive heart disease  [  ]Other:  -------------------------------------------------------------------  [ ] Respiratory failure  [ ] Acute cor pulmonale  [ x ] Asthma/COPD Exacerbation  [ ] Pleural effusion  [ ] Aspiration pneumonia  -------------------------------------------------------------------  [  ]MELINDA  [  ]ATN  [  ]Reneal Medullary Necrosis  [  ]Renal Cortical Necrosis  [  ]Other Pathological Lesions:    [  ]CKD 1  [  ]CKD 2  [  ]CKD 3  [  ]CKD 4  [  ]CKD 5  [  ]Other  -------------------------------------------------------------------  [ x ]Diabetes  [  ] Diabetic PVD Ulcer  [  ] Neuropathic ulcer to DM  [  ] Diabetes with Nephropathy  [  ] Osteomyelitis due to diabetes  --------------------------------------------------------------------  [  ]Malnutrition: See Nutrition Note  [  ]Cachexia  [  ]Other:   [  ]Supplement Ordered:  [  ]Morbid Obesity (BMI >=40]  ---------------------------------------------------------------------  [ ] Sepsis/severe sepsis/septic shock  [ ] UTI  [ ] Pneumonia  -----------------------------------------------------------------------  [ ] Acidosis/alkalosis  [ ] Fluid overload  [ ] Hypokalemia  [ ] Hyperkalemia  [ ] Hypomagnesemia  [ ] Hypophosphatemia  [ ] Hyperphosphatemia  ------------------------------------------------------------------------  [ ] Acute blood loss anemia  [ ] Post op blood loss anemia  [x ] Iron deficiency anemia  [ ] Anemia due to chronic disease  [ ] Hypercoagulable state  ----------------------------------------------------------------------  [ ] Cerebral infarction  [ ] Transient ischemia attack  [ ] Encephalopathy      A/P: 81y M with a PMH of HTN, afib on eliquis (last take friday), smoking, interstitial lung diease, PAD s/p LLE stents with Dr. Zambrano found to have iAAA on CT in April 2020 measuring 4.3cm. iAAA grew to 5cm 6 months later on f/u scan. Patient presenting for elective repair of iAAA. Now POD1 from EVAR.       Vascular/PAD:   - plavix,   - s/p EVAR 2/7       HTN/HLD:  -resume home meds   - a fib rvr / tahycadia - given metoprolol 5mg 2x ovn,   - Cards recommending lopressor 5mg PRN for HR >120    DM:  - ISS      Diet:   CLD     Activity: bed rest 24 hrs.       DVTPPx: sqh       Dispo: 5 uris

## 2022-02-10 DIAGNOSIS — I50.32 CHRONIC DIASTOLIC (CONGESTIVE) HEART FAILURE: ICD-10-CM

## 2022-02-10 DIAGNOSIS — Z79.01 LONG TERM (CURRENT) USE OF ANTICOAGULANTS: ICD-10-CM

## 2022-02-10 DIAGNOSIS — E78.5 HYPERLIPIDEMIA, UNSPECIFIED: ICD-10-CM

## 2022-02-10 DIAGNOSIS — J44.9 CHRONIC OBSTRUCTIVE PULMONARY DISEASE, UNSPECIFIED: ICD-10-CM

## 2022-02-10 DIAGNOSIS — Z79.02 LONG TERM (CURRENT) USE OF ANTITHROMBOTICS/ANTIPLATELETS: ICD-10-CM

## 2022-02-10 DIAGNOSIS — D50.9 IRON DEFICIENCY ANEMIA, UNSPECIFIED: ICD-10-CM

## 2022-02-10 DIAGNOSIS — I48.20 CHRONIC ATRIAL FIBRILLATION, UNSPECIFIED: ICD-10-CM

## 2022-02-10 DIAGNOSIS — I71.4 ABDOMINAL AORTIC ANEURYSM, WITHOUT RUPTURE: ICD-10-CM

## 2022-02-10 DIAGNOSIS — R50.82 POSTPROCEDURAL FEVER: ICD-10-CM

## 2022-02-10 DIAGNOSIS — I11.0 HYPERTENSIVE HEART DISEASE WITH HEART FAILURE: ICD-10-CM

## 2022-02-10 DIAGNOSIS — J84.9 INTERSTITIAL PULMONARY DISEASE, UNSPECIFIED: ICD-10-CM

## 2022-02-18 ENCOUNTER — APPOINTMENT (OUTPATIENT)
Dept: VASCULAR SURGERY | Facility: CLINIC | Age: 82
End: 2022-02-18
Payer: MEDICARE

## 2022-02-18 PROCEDURE — 99024 POSTOP FOLLOW-UP VISIT: CPT

## 2022-02-18 NOTE — PHYSICAL EXAM
[2+] : left 2+ [de-identified] : well appearing NAD [FreeTextEntry1] : I [de-identified] : Groin incisions well healed, no erythema no hematoma no induration

## 2022-02-18 NOTE — HISTORY OF PRESENT ILLNESS
[FreeTextEntry1] : 80 yo M 2 weeks s/p EVAR for saccular infrarenal AAA, here for follow up. Doing well, had a UTI postoperatively and just completed antibiotics. Denies any dysuria, fevers or chills. Denies any claudication. Does complain of some occasional lower back pain that is possitional. Denies any abdominal pain, nausea or vomiting. And denies any claudication symptoms.

## 2022-02-18 NOTE — ASSESSMENT
[FreeTextEntry1] : 80 yo M s/p EVAR\par \par 1) Will follow up in 3 months for CTA\par 2) Continue ambulation

## 2022-03-07 ENCOUNTER — APPOINTMENT (OUTPATIENT)
Dept: HEART AND VASCULAR | Facility: CLINIC | Age: 82
End: 2022-03-07
Payer: MEDICARE

## 2022-03-07 PROCEDURE — 96374 THER/PROPH/DIAG INJ IV PUSH: CPT | Mod: 59

## 2022-03-07 PROCEDURE — 93015 CV STRESS TEST SUPVJ I&R: CPT

## 2022-03-07 PROCEDURE — 78452 HT MUSCLE IMAGE SPECT MULT: CPT

## 2022-03-07 PROCEDURE — A9500: CPT

## 2022-04-01 NOTE — HISTORY OF PRESENT ILLNESS
[FreeTextEntry1] : 81 yo M with PMH HTn, smoking, interstitial lung disease, PVD s/p LLE stents with Dr Zambrano, found on CT to have a AAA in April measuring 4.4cm, here for routine follow up. Repeat ultrasound shows aneurysm size to be 4.9cm. He denies any abdominal or back pain. He does complain of RLE 1 block claudication that has slowly been improving with an exercise regimen. \par \par He denies any chest pain, SOB, nausea or vomiting. He has no family history of AAA as far as he knows. Has no history of any coronary artery disease. 
Yes

## 2022-04-25 NOTE — H&P ADULT - CONSTITUTIONAL
[FreeTextEntry1] : Impression: Mild persistent bronchial asthma, possible allergic rhinitis, possible vitamin D deficiency.\par \par Mild persistent bronchial asthma: Results of spirometry and exhaled nitric oxide testing were discussed.  Flovent 44 was prescribed, 2 puffs twice daily with a spacer and mask .  Technique of inhaler use with spacer was reviewed.  Montelukast was prescribed, 5 mg daily.  Albuterol with a spacer is to be used prior to activity and every 4 hours as needed.  Asthma action plan was provided in writing to increase medications with viral respiratory infections.  Extensive asthma education was provided by our asthma educator.  Labs checked by her pediatrician were reviewed.  Respiratory allergy panel is being checked by the [employer] technique.  Claritin is to be administered as needed.\par \par Possible vitamin D deficiency: 25 hydroxy vitamin D level is being checked.\par \par Over 50% of time was spent in counseling.  I asked father to bring her back for a follow-up visit in a month's time. detailed exam

## 2022-05-06 ENCOUNTER — APPOINTMENT (OUTPATIENT)
Dept: HEART AND VASCULAR | Facility: CLINIC | Age: 82
End: 2022-05-06

## 2022-06-27 NOTE — DISCHARGE NOTE PROVIDER - REASON FOR ADMISSION
elective EVAR Adbry Pregnancy And Lactation Text: It is unknown if this medication will adversely affect pregnancy or breast feeding.

## 2023-05-15 ENCOUNTER — APPOINTMENT (OUTPATIENT)
Dept: HEART AND VASCULAR | Facility: CLINIC | Age: 83
End: 2023-05-15
Payer: MEDICARE

## 2023-05-15 ENCOUNTER — NON-APPOINTMENT (OUTPATIENT)
Age: 83
End: 2023-05-15

## 2023-05-15 VITALS
SYSTOLIC BLOOD PRESSURE: 130 MMHG | BODY MASS INDEX: 30.36 KG/M2 | DIASTOLIC BLOOD PRESSURE: 80 MMHG | WEIGHT: 165 LBS | RESPIRATION RATE: 12 BRPM | HEIGHT: 62 IN | HEART RATE: 80 BPM

## 2023-05-15 DIAGNOSIS — I50.32 CHRONIC DIASTOLIC (CONGESTIVE) HEART FAILURE: ICD-10-CM

## 2023-05-15 DIAGNOSIS — R09.89 OTHER SPECIFIED SYMPTOMS AND SIGNS INVOLVING THE CIRCULATORY AND RESPIRATORY SYSTEMS: ICD-10-CM

## 2023-05-15 DIAGNOSIS — I71.40 ABDOMINAL AORTIC ANEURYSM, WITHOUT RUPTURE, UNSPECIFIED: ICD-10-CM

## 2023-05-15 DIAGNOSIS — Z86.39 PERSONAL HISTORY OF OTHER ENDOCRINE, NUTRITIONAL AND METABOLIC DISEASE: ICD-10-CM

## 2023-05-15 DIAGNOSIS — Z86.79 PERSONAL HISTORY OF OTHER DISEASES OF THE CIRCULATORY SYSTEM: ICD-10-CM

## 2023-05-15 DIAGNOSIS — I48.91 UNSPECIFIED ATRIAL FIBRILLATION: ICD-10-CM

## 2023-05-15 PROCEDURE — 93880 EXTRACRANIAL BILAT STUDY: CPT

## 2023-05-15 PROCEDURE — 93000 ELECTROCARDIOGRAM COMPLETE: CPT

## 2023-05-15 PROCEDURE — 99214 OFFICE O/P EST MOD 30 MIN: CPT | Mod: 25

## 2023-05-15 PROCEDURE — ZZZZZ: CPT

## 2023-05-15 PROCEDURE — 93306 TTE W/DOPPLER COMPLETE: CPT

## 2023-05-15 NOTE — HISTORY OF PRESENT ILLNESS
[FreeTextEntry1] : 82-year-old male with past medical history of CAD, AAA, atrial fibrillation hyperlipidemia comes in for evaluation of chest discomfort.  Patient reports a few months of left-sided chest discomfort that comes and goes worse on exertional activity.  Relieved with rest.  Denies any associated shortness of breath PND orthopnea.

## 2023-05-15 NOTE — PHYSICAL EXAM

## 2023-05-22 ENCOUNTER — APPOINTMENT (OUTPATIENT)
Dept: HEART AND VASCULAR | Facility: CLINIC | Age: 83
End: 2023-05-22
Payer: MEDICARE

## 2023-05-22 DIAGNOSIS — R07.9 CHEST PAIN, UNSPECIFIED: ICD-10-CM

## 2023-05-22 PROCEDURE — 96374 THER/PROPH/DIAG INJ IV PUSH: CPT | Mod: 59

## 2023-05-22 PROCEDURE — 78452 HT MUSCLE IMAGE SPECT MULT: CPT

## 2023-05-22 PROCEDURE — 93015 CV STRESS TEST SUPVJ I&R: CPT

## 2023-05-22 PROCEDURE — A9500: CPT

## 2023-06-22 NOTE — PHYSICAL EXAM
[2+] : left 2+ [de-identified] : well appearing NAD [de-identified] : Groin incisions well healed, no erythema no hematoma no induration

## 2023-06-22 NOTE — ADDENDUM
[FreeTextEntry1] : I, Dr. Deandre Bush, personally performed the evaluation and management (E/M) services for this established patient who presents today with (an) existing condition(s).  That E/M includes conducting the examination, assessing all conditions, and (re)establishing/reinforcing a plan of care.  Today, my ACP, Lelia BARCENAS, was here to observe my evaluation and management services for this condition to be followed going forward.\par \par \par

## 2023-06-22 NOTE — ASSESSMENT
[FreeTextEntry1] : 82yoM with CAD, AFib., HLD, HTN, s/p EVAR on 2/7/22 by Dr. Henderson for saccular infrarenal AAA, here for follow up.\par  [Aneurysm Surgery] : aneurysm surgery

## 2023-06-22 NOTE — PROCEDURE
[FreeTextEntry1] : Arterial duplex was done in the office demonstrating patent EVG, no evidence of endoleak

## 2023-06-22 NOTE — HISTORY OF PRESENT ILLNESS
[FreeTextEntry1] : 82yoM with CAD, AFib., HLD, HTN, s/p EVAR on 2/7/22 by Dr. Henderson for saccular infrarenal AAA, here for follow up. Doing well, denies any abdominal/back pain, no claudication.

## 2023-06-26 ENCOUNTER — APPOINTMENT (OUTPATIENT)
Dept: VASCULAR SURGERY | Facility: CLINIC | Age: 83
End: 2023-06-26

## 2023-09-06 NOTE — PATIENT PROFILE ADULT - OVER THE PAST TWO WEEKS, HAVE YOU FELT LITTLE INTEREST OR PLEASURE IN DOING THINGS?
Name: Elias Pollock  YOB: 1949  Gender: male  MRN: [id number]     CC: LEFT Knee Osteoarthritis      PROCEDURE: #3 of 3 Hyaluronic Injection    After discussion of risks and benefits including but not limited to pain, infection, skin discoloration, and injury to blood vessels or nerves the patient verbally consented to proceed with injection of the LEFT. The patient is to restrict their activity for 48 hours. Radiology Report: US guidance was used to examine the joint, ensure adequate needle placement and to decrease the risk of joint aggravation. The intracondylar notch, retropatellar fat pad, patella tendon, patella and tibia were all visualized. Pre and post injection US still images were obtained and placed in the record. Image were obtained with a [employer] ultrasound transducer (model [id number]). Procedure Note: After steriley prepping the LEFT knee, it was injected with a 2mL of Synvisc and the medication was observed going into the intra-articular space via US guidance. The patient tolerated the procedure without difficulty.     LIZZ Azevedo no